# Patient Record
Sex: MALE | Race: WHITE | NOT HISPANIC OR LATINO | Employment: OTHER | URBAN - NONMETROPOLITAN AREA
[De-identification: names, ages, dates, MRNs, and addresses within clinical notes are randomized per-mention and may not be internally consistent; named-entity substitution may affect disease eponyms.]

---

## 2017-07-25 ENCOUNTER — HOSPITAL ENCOUNTER (INPATIENT)
Facility: HOSPITAL | Age: 82
LOS: 1 days | Discharge: HOME | End: 2017-07-27
Attending: FAMILY MEDICINE | Admitting: INTERNAL MEDICINE

## 2017-07-25 DIAGNOSIS — R07.89 ATYPICAL CHEST PAIN: ICD-10-CM

## 2017-07-25 DIAGNOSIS — R00.1 BRADYCARDIA: Primary | ICD-10-CM

## 2017-07-25 LAB
HOLD SPECIMEN: NORMAL
HOLD SPECIMEN: NORMAL
TROPONIN I SERPL-MCNC: 0.01 NG/ML (ref 0–0.03)
WHOLE BLOOD HOLD SPECIMEN: NORMAL
WHOLE BLOOD HOLD SPECIMEN: NORMAL

## 2017-07-25 PROCEDURE — 93005 ELECTROCARDIOGRAM TRACING: CPT

## 2017-07-25 PROCEDURE — 94799 UNLISTED PULMONARY SVC/PX: CPT

## 2017-07-25 PROCEDURE — 93010 ELECTROCARDIOGRAM REPORT: CPT | Performed by: INTERNAL MEDICINE

## 2017-07-25 PROCEDURE — 84484 ASSAY OF TROPONIN QUANT: CPT | Performed by: FAMILY MEDICINE

## 2017-07-25 PROCEDURE — G0378 HOSPITAL OBSERVATION PER HR: HCPCS

## 2017-07-25 PROCEDURE — 94760 N-INVAS EAR/PLS OXIMETRY 1: CPT

## 2017-07-25 PROCEDURE — 99285 EMERGENCY DEPT VISIT HI MDM: CPT

## 2017-07-25 RX ORDER — ALBUTEROL SULFATE 90 UG/1
2 AEROSOL, METERED RESPIRATORY (INHALATION) EVERY 4 HOURS PRN
COMMUNITY

## 2017-07-25 RX ORDER — SIMVASTATIN 80 MG
80 TABLET ORAL NIGHTLY
COMMUNITY

## 2017-07-25 RX ORDER — ASPIRIN 81 MG/1
81 TABLET, CHEWABLE ORAL DAILY
COMMUNITY

## 2017-07-25 RX ORDER — CHLORAL HYDRATE 500 MG
CAPSULE ORAL
COMMUNITY

## 2017-07-26 ENCOUNTER — APPOINTMENT (OUTPATIENT)
Dept: GENERAL RADIOLOGY | Facility: HOSPITAL | Age: 82
End: 2017-07-26

## 2017-07-26 LAB
ALBUMIN SERPL-MCNC: 4.1 G/DL (ref 3.5–5)
ALBUMIN/GLOB SERPL: 1.5 G/DL (ref 1.1–2.5)
ALP SERPL-CCNC: 78 U/L (ref 24–120)
ALT SERPL W P-5'-P-CCNC: 51 U/L (ref 0–54)
ANION GAP SERPL CALCULATED.3IONS-SCNC: 9 MMOL/L (ref 4–13)
AST SERPL-CCNC: 40 U/L (ref 7–45)
BASOPHILS # BLD AUTO: 0.04 10*3/MM3 (ref 0–0.2)
BASOPHILS NFR BLD AUTO: 0.4 % (ref 0–2)
BILIRUB SERPL-MCNC: 0.9 MG/DL (ref 0.1–1)
BILIRUB UR QL STRIP: NEGATIVE
BUN BLD-MCNC: 11 MG/DL (ref 5–21)
BUN/CREAT SERPL: 13.8 (ref 7–25)
CALCIUM SPEC-SCNC: 9.3 MG/DL (ref 8.4–10.4)
CHLORIDE SERPL-SCNC: 108 MMOL/L (ref 98–110)
CLARITY UR: CLEAR
CO2 SERPL-SCNC: 27 MMOL/L (ref 24–31)
COLOR UR: YELLOW
CREAT BLD-MCNC: 0.8 MG/DL (ref 0.5–1.4)
DEPRECATED RDW RBC AUTO: 46.3 FL (ref 40–54)
EOSINOPHIL # BLD AUTO: 0.18 10*3/MM3 (ref 0–0.7)
EOSINOPHIL NFR BLD AUTO: 1.8 % (ref 0–4)
ERYTHROCYTE [DISTWIDTH] IN BLOOD BY AUTOMATED COUNT: 14.1 % (ref 12–15)
GFR SERPL CREATININE-BSD FRML MDRD: 93 ML/MIN/1.73
GLOBULIN UR ELPH-MCNC: 2.8 GM/DL
GLUCOSE BLD-MCNC: 98 MG/DL (ref 70–100)
GLUCOSE UR STRIP-MCNC: NEGATIVE MG/DL
HCT VFR BLD AUTO: 45.2 % (ref 40–52)
HGB BLD-MCNC: 15.3 G/DL (ref 14–18)
HGB UR QL STRIP.AUTO: NEGATIVE
IMM GRANULOCYTES # BLD: 0.03 10*3/MM3 (ref 0–0.03)
IMM GRANULOCYTES NFR BLD: 0.3 % (ref 0–5)
INR PPP: 0.92 (ref 0.91–1.09)
KETONES UR QL STRIP: NEGATIVE
LEUKOCYTE ESTERASE UR QL STRIP.AUTO: NEGATIVE
LYMPHOCYTES # BLD AUTO: 1.23 10*3/MM3 (ref 0.72–4.86)
LYMPHOCYTES NFR BLD AUTO: 12.3 % (ref 15–45)
MCH RBC QN AUTO: 30.7 PG (ref 28–32)
MCHC RBC AUTO-ENTMCNC: 33.8 G/DL (ref 33–36)
MCV RBC AUTO: 90.6 FL (ref 82–95)
MONOCYTES # BLD AUTO: 0.68 10*3/MM3 (ref 0.19–1.3)
MONOCYTES NFR BLD AUTO: 6.8 % (ref 4–12)
NEUTROPHILS # BLD AUTO: 7.88 10*3/MM3 (ref 1.87–8.4)
NEUTROPHILS NFR BLD AUTO: 78.4 % (ref 39–78)
NITRITE UR QL STRIP: NEGATIVE
PH UR STRIP.AUTO: 6 [PH] (ref 5–8)
PLATELET # BLD AUTO: 184 10*3/MM3 (ref 130–400)
PMV BLD AUTO: 12.8 FL (ref 6–12)
POTASSIUM BLD-SCNC: 4.7 MMOL/L (ref 3.5–5.3)
PROT SERPL-MCNC: 6.9 G/DL (ref 6.3–8.7)
PROT UR QL STRIP: NEGATIVE
PROTHROMBIN TIME: 12.6 SECONDS (ref 11.9–14.6)
RBC # BLD AUTO: 4.99 10*6/MM3 (ref 4.8–5.9)
SODIUM BLD-SCNC: 144 MMOL/L (ref 135–145)
SP GR UR STRIP: 1.02 (ref 1–1.03)
TSH SERPL DL<=0.05 MIU/L-ACNC: 1.87 MIU/ML (ref 0.47–4.68)
UROBILINOGEN UR QL STRIP: NORMAL
WBC NRBC COR # BLD: 10.04 10*3/MM3 (ref 4.8–10.8)

## 2017-07-26 PROCEDURE — 71010 HC CHEST PA OR AP: CPT

## 2017-07-26 PROCEDURE — 93010 ELECTROCARDIOGRAM REPORT: CPT | Performed by: INTERNAL MEDICINE

## 2017-07-26 PROCEDURE — 84443 ASSAY THYROID STIM HORMONE: CPT | Performed by: NURSE PRACTITIONER

## 2017-07-26 PROCEDURE — 25010000002 FENTANYL CITRATE (PF) 100 MCG/2ML SOLUTION: Performed by: INTERNAL MEDICINE

## 2017-07-26 PROCEDURE — C1898 LEAD, PMKR, OTHER THAN TRANS: HCPCS | Performed by: INTERNAL MEDICINE

## 2017-07-26 PROCEDURE — C1785 PMKR, DUAL, RATE-RESP: HCPCS | Performed by: INTERNAL MEDICINE

## 2017-07-26 PROCEDURE — 99222 1ST HOSP IP/OBS MODERATE 55: CPT | Performed by: INTERNAL MEDICINE

## 2017-07-26 PROCEDURE — 93005 ELECTROCARDIOGRAM TRACING: CPT | Performed by: INTERNAL MEDICINE

## 2017-07-26 PROCEDURE — C1892 INTRO/SHEATH,FIXED,PEEL-AWAY: HCPCS | Performed by: INTERNAL MEDICINE

## 2017-07-26 PROCEDURE — 02HK3JZ INSERTION OF PACEMAKER LEAD INTO RIGHT VENTRICLE, PERCUTANEOUS APPROACH: ICD-10-PCS | Performed by: INTERNAL MEDICINE

## 2017-07-26 PROCEDURE — 02H63JZ INSERTION OF PACEMAKER LEAD INTO RIGHT ATRIUM, PERCUTANEOUS APPROACH: ICD-10-PCS | Performed by: INTERNAL MEDICINE

## 2017-07-26 PROCEDURE — 0JH606Z INSERTION OF PACEMAKER, DUAL CHAMBER INTO CHEST SUBCUTANEOUS TISSUE AND FASCIA, OPEN APPROACH: ICD-10-PCS | Performed by: INTERNAL MEDICINE

## 2017-07-26 PROCEDURE — 80053 COMPREHEN METABOLIC PANEL: CPT | Performed by: NURSE PRACTITIONER

## 2017-07-26 PROCEDURE — 33208 INSRT HEART PM ATRIAL & VENT: CPT | Performed by: INTERNAL MEDICINE

## 2017-07-26 PROCEDURE — 81003 URINALYSIS AUTO W/O SCOPE: CPT | Performed by: NURSE PRACTITIONER

## 2017-07-26 PROCEDURE — 85610 PROTHROMBIN TIME: CPT | Performed by: NURSE PRACTITIONER

## 2017-07-26 PROCEDURE — 99152 MOD SED SAME PHYS/QHP 5/>YRS: CPT | Performed by: INTERNAL MEDICINE

## 2017-07-26 PROCEDURE — 25010000002 MIDAZOLAM PER 1 MG: Performed by: INTERNAL MEDICINE

## 2017-07-26 PROCEDURE — 85025 COMPLETE CBC W/AUTO DIFF WBC: CPT | Performed by: NURSE PRACTITIONER

## 2017-07-26 DEVICE — PACEMAKER
Type: IMPLANTABLE DEVICE | Status: FUNCTIONAL
Brand: ESSENTIO™ MRI EL DR

## 2017-07-26 DEVICE — IMPLANTABLE DEVICE: Type: IMPLANTABLE DEVICE | Status: FUNCTIONAL

## 2017-07-26 RX ORDER — HYDROCODONE BITARTRATE AND ACETAMINOPHEN 5; 325 MG/1; MG/1
1 TABLET ORAL EVERY 4 HOURS PRN
Status: DISCONTINUED | OUTPATIENT
Start: 2017-07-26 | End: 2017-07-27 | Stop reason: HOSPADM

## 2017-07-26 RX ORDER — DIPHENHYDRAMINE HCL 25 MG
25 CAPSULE ORAL NIGHTLY PRN
Status: DISCONTINUED | OUTPATIENT
Start: 2017-07-26 | End: 2017-07-27 | Stop reason: HOSPADM

## 2017-07-26 RX ORDER — HYDROCODONE BITARTRATE AND ACETAMINOPHEN 7.5; 325 MG/1; MG/1
2 TABLET ORAL EVERY 4 HOURS PRN
Status: DISCONTINUED | OUTPATIENT
Start: 2017-07-26 | End: 2017-07-27 | Stop reason: HOSPADM

## 2017-07-26 RX ORDER — ACETAMINOPHEN 325 MG/1
650 TABLET ORAL EVERY 4 HOURS PRN
Status: DISCONTINUED | OUTPATIENT
Start: 2017-07-26 | End: 2017-07-27 | Stop reason: HOSPADM

## 2017-07-26 RX ORDER — AMLODIPINE BESYLATE 5 MG/1
5 TABLET ORAL
Status: DISCONTINUED | OUTPATIENT
Start: 2017-07-26 | End: 2017-07-27 | Stop reason: HOSPADM

## 2017-07-26 RX ORDER — MIDAZOLAM HYDROCHLORIDE 1 MG/ML
INJECTION INTRAMUSCULAR; INTRAVENOUS AS NEEDED
Status: DISCONTINUED | OUTPATIENT
Start: 2017-07-26 | End: 2017-07-26 | Stop reason: HOSPADM

## 2017-07-26 RX ORDER — FAMOTIDINE 20 MG/1
20 TABLET, FILM COATED ORAL 2 TIMES DAILY
Status: DISCONTINUED | OUTPATIENT
Start: 2017-07-26 | End: 2017-07-27 | Stop reason: HOSPADM

## 2017-07-26 RX ORDER — ALBUTEROL SULFATE 2.5 MG/3ML
2.5 SOLUTION RESPIRATORY (INHALATION) EVERY 6 HOURS PRN
Status: DISCONTINUED | OUTPATIENT
Start: 2017-07-26 | End: 2017-07-27 | Stop reason: HOSPADM

## 2017-07-26 RX ORDER — ATORVASTATIN CALCIUM 40 MG/1
40 TABLET, FILM COATED ORAL NIGHTLY
Status: DISCONTINUED | OUTPATIENT
Start: 2017-07-26 | End: 2017-07-27 | Stop reason: HOSPADM

## 2017-07-26 RX ORDER — FENTANYL CITRATE 50 UG/ML
INJECTION, SOLUTION INTRAMUSCULAR; INTRAVENOUS AS NEEDED
Status: DISCONTINUED | OUTPATIENT
Start: 2017-07-26 | End: 2017-07-26 | Stop reason: HOSPADM

## 2017-07-26 RX ORDER — DIPHENHYDRAMINE HYDROCHLORIDE 50 MG/ML
25 INJECTION INTRAMUSCULAR; INTRAVENOUS NIGHTLY PRN
Status: DISCONTINUED | OUTPATIENT
Start: 2017-07-26 | End: 2017-07-27 | Stop reason: HOSPADM

## 2017-07-26 RX ORDER — NALOXONE HCL 0.4 MG/ML
0.4 VIAL (ML) INJECTION
Status: DISCONTINUED | OUTPATIENT
Start: 2017-07-26 | End: 2017-07-27 | Stop reason: HOSPADM

## 2017-07-26 RX ORDER — LIDOCAINE HYDROCHLORIDE 20 MG/ML
INJECTION, SOLUTION INFILTRATION; PERINEURAL AS NEEDED
Status: DISCONTINUED | OUTPATIENT
Start: 2017-07-26 | End: 2017-07-26 | Stop reason: HOSPADM

## 2017-07-26 RX ORDER — SODIUM CHLORIDE 0.9 % (FLUSH) 0.9 %
1-10 SYRINGE (ML) INJECTION AS NEEDED
Status: DISCONTINUED | OUTPATIENT
Start: 2017-07-26 | End: 2017-07-27 | Stop reason: HOSPADM

## 2017-07-26 RX ORDER — SODIUM CHLORIDE 0.9 % (FLUSH) 0.9 %
1-10 SYRINGE (ML) INJECTION AS NEEDED
Status: DISCONTINUED | OUTPATIENT
Start: 2017-07-26 | End: 2017-07-26

## 2017-07-26 RX ORDER — ONDANSETRON 2 MG/ML
4 INJECTION INTRAMUSCULAR; INTRAVENOUS EVERY 6 HOURS PRN
Status: DISCONTINUED | OUTPATIENT
Start: 2017-07-26 | End: 2017-07-27 | Stop reason: HOSPADM

## 2017-07-26 RX ORDER — ASPIRIN 81 MG/1
81 TABLET, CHEWABLE ORAL DAILY
Status: DISCONTINUED | OUTPATIENT
Start: 2017-07-26 | End: 2017-07-27 | Stop reason: HOSPADM

## 2017-07-26 RX ADMIN — AMLODIPINE BESYLATE 5 MG: 5 TABLET ORAL at 18:28

## 2017-07-26 RX ADMIN — FAMOTIDINE 20 MG: 20 TABLET, FILM COATED ORAL at 18:28

## 2017-07-26 RX ADMIN — Medication 1 APPLICATION: at 18:28

## 2017-07-26 RX ADMIN — ACETAMINOPHEN 650 MG: 325 TABLET, FILM COATED ORAL at 20:14

## 2017-07-26 RX ADMIN — DESMOPRESSIN ACETATE 40 MG: 0.2 TABLET ORAL at 20:06

## 2017-07-26 RX ADMIN — ASPIRIN 81 MG 81 MG: 81 TABLET ORAL at 18:28

## 2017-07-27 VITALS
WEIGHT: 165.63 LBS | OXYGEN SATURATION: 94 % | TEMPERATURE: 98.1 F | BODY MASS INDEX: 23.71 KG/M2 | SYSTOLIC BLOOD PRESSURE: 93 MMHG | HEIGHT: 70 IN | RESPIRATION RATE: 18 BRPM | HEART RATE: 60 BPM | DIASTOLIC BLOOD PRESSURE: 48 MMHG

## 2017-07-27 PROBLEM — Z95.0 S/P CARDIAC PACEMAKER PROCEDURE: Status: ACTIVE | Noted: 2017-07-27

## 2017-07-27 PROBLEM — I10 ESSENTIAL HYPERTENSION: Status: ACTIVE | Noted: 2017-07-27

## 2017-07-27 PROBLEM — I25.10 CORONARY ARTERY DISEASE INVOLVING NATIVE CORONARY ARTERY OF NATIVE HEART WITHOUT ANGINA PECTORIS: Status: ACTIVE | Noted: 2017-07-27

## 2017-07-27 PROBLEM — I44.1 MOBITZ TYPE 2 SECOND DEGREE HEART BLOCK: Status: ACTIVE | Noted: 2017-07-27

## 2017-07-27 PROBLEM — E78.2 MIXED HYPERLIPIDEMIA: Status: ACTIVE | Noted: 2017-07-27

## 2017-07-27 RX ADMIN — AMLODIPINE BESYLATE 5 MG: 5 TABLET ORAL at 08:14

## 2017-07-27 RX ADMIN — ASPIRIN 81 MG 81 MG: 81 TABLET ORAL at 08:14

## 2017-07-27 RX ADMIN — FAMOTIDINE 20 MG: 20 TABLET, FILM COATED ORAL at 08:14

## 2017-07-27 RX ADMIN — ACETAMINOPHEN 650 MG: 325 TABLET, FILM COATED ORAL at 08:14

## 2017-07-27 RX ADMIN — Medication 1 APPLICATION: at 08:14

## 2017-08-01 ENCOUNTER — APPOINTMENT (OUTPATIENT)
Dept: GENERAL RADIOLOGY | Facility: HOSPITAL | Age: 82
End: 2017-08-01

## 2017-08-01 ENCOUNTER — HOSPITAL ENCOUNTER (EMERGENCY)
Facility: HOSPITAL | Age: 82
Discharge: HOME | End: 2017-08-02
Attending: EMERGENCY MEDICINE | Admitting: EMERGENCY MEDICINE

## 2017-08-01 DIAGNOSIS — R11.0 NAUSEA: Primary | ICD-10-CM

## 2017-08-01 LAB
ALBUMIN SERPL-MCNC: 4.4 G/DL (ref 3.5–5)
ALBUMIN/GLOB SERPL: 1.5 G/DL (ref 1.1–2.5)
ALP SERPL-CCNC: 85 U/L (ref 24–120)
ALT SERPL W P-5'-P-CCNC: 43 U/L (ref 0–54)
AMYLASE SERPL-CCNC: 62 U/L (ref 30–110)
ANION GAP SERPL CALCULATED.3IONS-SCNC: 11 MMOL/L (ref 4–13)
AST SERPL-CCNC: 34 U/L (ref 7–45)
BASOPHILS # BLD AUTO: 0.05 10*3/MM3 (ref 0–0.2)
BASOPHILS NFR BLD AUTO: 0.5 % (ref 0–2)
BILIRUB SERPL-MCNC: 0.5 MG/DL (ref 0.1–1)
BILIRUB UR QL STRIP: NEGATIVE
BUN BLD-MCNC: 10 MG/DL (ref 5–21)
BUN/CREAT SERPL: 12.5 (ref 7–25)
CALCIUM SPEC-SCNC: 9.4 MG/DL (ref 8.4–10.4)
CHLORIDE SERPL-SCNC: 105 MMOL/L (ref 98–110)
CLARITY UR: CLEAR
CO2 SERPL-SCNC: 24 MMOL/L (ref 24–31)
COLOR UR: YELLOW
CREAT BLD-MCNC: 0.8 MG/DL (ref 0.5–1.4)
DEPRECATED RDW RBC AUTO: 46.1 FL (ref 40–54)
EOSINOPHIL # BLD AUTO: 0.23 10*3/MM3 (ref 0–0.7)
EOSINOPHIL NFR BLD AUTO: 2.5 % (ref 0–4)
ERYTHROCYTE [DISTWIDTH] IN BLOOD BY AUTOMATED COUNT: 13.9 % (ref 12–15)
GFR SERPL CREATININE-BSD FRML MDRD: 93 ML/MIN/1.73
GLOBULIN UR ELPH-MCNC: 3 GM/DL
GLUCOSE BLD-MCNC: 97 MG/DL (ref 70–100)
GLUCOSE BLDC GLUCOMTR-MCNC: 132 MG/DL (ref 70–130)
GLUCOSE UR STRIP-MCNC: NEGATIVE MG/DL
HCT VFR BLD AUTO: 45.3 % (ref 40–52)
HGB BLD-MCNC: 15.4 G/DL (ref 14–18)
HGB UR QL STRIP.AUTO: NEGATIVE
HOLD SPECIMEN: NORMAL
HOLD SPECIMEN: NORMAL
IMM GRANULOCYTES # BLD: 0.01 10*3/MM3 (ref 0–0.03)
IMM GRANULOCYTES NFR BLD: 0.1 % (ref 0–5)
INR PPP: 0.9 (ref 0.91–1.09)
KETONES UR QL STRIP: NEGATIVE
LEUKOCYTE ESTERASE UR QL STRIP.AUTO: NEGATIVE
LIPASE SERPL-CCNC: 70 U/L (ref 23–203)
LYMPHOCYTES # BLD AUTO: 1.72 10*3/MM3 (ref 0.72–4.86)
LYMPHOCYTES NFR BLD AUTO: 18.6 % (ref 15–45)
MCH RBC QN AUTO: 30.8 PG (ref 28–32)
MCHC RBC AUTO-ENTMCNC: 34 G/DL (ref 33–36)
MCV RBC AUTO: 90.6 FL (ref 82–95)
MONOCYTES # BLD AUTO: 0.93 10*3/MM3 (ref 0.19–1.3)
MONOCYTES NFR BLD AUTO: 10.1 % (ref 4–12)
MYOGLOBIN SERPL-MCNC: 72.5 NG/ML (ref 0–110)
NEUTROPHILS # BLD AUTO: 6.31 10*3/MM3 (ref 1.87–8.4)
NEUTROPHILS NFR BLD AUTO: 68.2 % (ref 39–78)
NITRITE UR QL STRIP: NEGATIVE
PH UR STRIP.AUTO: 6 [PH] (ref 5–8)
PLATELET # BLD AUTO: 163 10*3/MM3 (ref 130–400)
PMV BLD AUTO: 13.4 FL (ref 6–12)
POTASSIUM BLD-SCNC: 3.6 MMOL/L (ref 3.5–5.3)
PROT SERPL-MCNC: 7.4 G/DL (ref 6.3–8.7)
PROT UR QL STRIP: NEGATIVE
PROTHROMBIN TIME: 12.4 SECONDS (ref 11.9–14.6)
RBC # BLD AUTO: 5 10*6/MM3 (ref 4.8–5.9)
SODIUM BLD-SCNC: 140 MMOL/L (ref 135–145)
SP GR UR STRIP: 1.01 (ref 1–1.03)
TROPONIN I SERPL-MCNC: <0.012 NG/ML (ref 0–0.03)
TROPONIN I SERPL-MCNC: <0.012 NG/ML (ref 0–0.03)
UROBILINOGEN UR QL STRIP: NORMAL
WBC NRBC COR # BLD: 9.25 10*3/MM3 (ref 4.8–10.8)
WHOLE BLOOD HOLD SPECIMEN: NORMAL
WHOLE BLOOD HOLD SPECIMEN: NORMAL

## 2017-08-01 PROCEDURE — 82150 ASSAY OF AMYLASE: CPT | Performed by: EMERGENCY MEDICINE

## 2017-08-01 PROCEDURE — 93005 ELECTROCARDIOGRAM TRACING: CPT | Performed by: EMERGENCY MEDICINE

## 2017-08-01 PROCEDURE — 85610 PROTHROMBIN TIME: CPT | Performed by: EMERGENCY MEDICINE

## 2017-08-01 PROCEDURE — 83690 ASSAY OF LIPASE: CPT | Performed by: EMERGENCY MEDICINE

## 2017-08-01 PROCEDURE — 93005 ELECTROCARDIOGRAM TRACING: CPT

## 2017-08-01 PROCEDURE — 80053 COMPREHEN METABOLIC PANEL: CPT | Performed by: EMERGENCY MEDICINE

## 2017-08-01 PROCEDURE — 96374 THER/PROPH/DIAG INJ IV PUSH: CPT

## 2017-08-01 PROCEDURE — 82962 GLUCOSE BLOOD TEST: CPT

## 2017-08-01 PROCEDURE — 99284 EMERGENCY DEPT VISIT MOD MDM: CPT

## 2017-08-01 PROCEDURE — 81003 URINALYSIS AUTO W/O SCOPE: CPT | Performed by: EMERGENCY MEDICINE

## 2017-08-01 PROCEDURE — 83874 ASSAY OF MYOGLOBIN: CPT | Performed by: EMERGENCY MEDICINE

## 2017-08-01 PROCEDURE — 93010 ELECTROCARDIOGRAM REPORT: CPT | Performed by: INTERNAL MEDICINE

## 2017-08-01 PROCEDURE — 71010 HC CHEST PA OR AP: CPT

## 2017-08-01 PROCEDURE — 96361 HYDRATE IV INFUSION ADD-ON: CPT

## 2017-08-01 PROCEDURE — 84484 ASSAY OF TROPONIN QUANT: CPT | Performed by: EMERGENCY MEDICINE

## 2017-08-01 PROCEDURE — 25010000002 ONDANSETRON PER 1 MG: Performed by: EMERGENCY MEDICINE

## 2017-08-01 PROCEDURE — 85025 COMPLETE CBC W/AUTO DIFF WBC: CPT | Performed by: EMERGENCY MEDICINE

## 2017-08-01 RX ORDER — ONDANSETRON 2 MG/ML
4 INJECTION INTRAMUSCULAR; INTRAVENOUS ONCE
Status: COMPLETED | OUTPATIENT
Start: 2017-08-01 | End: 2017-08-01

## 2017-08-01 RX ADMIN — ONDANSETRON 4 MG: 2 INJECTION INTRAMUSCULAR; INTRAVENOUS at 21:52

## 2017-08-01 RX ADMIN — SODIUM CHLORIDE 500 ML: 9 INJECTION, SOLUTION INTRAVENOUS at 21:54

## 2017-08-02 VITALS
RESPIRATION RATE: 16 BRPM | WEIGHT: 173 LBS | HEIGHT: 71 IN | SYSTOLIC BLOOD PRESSURE: 124 MMHG | HEART RATE: 63 BPM | OXYGEN SATURATION: 93 % | TEMPERATURE: 98.3 F | BODY MASS INDEX: 24.22 KG/M2 | DIASTOLIC BLOOD PRESSURE: 60 MMHG

## 2017-08-02 NOTE — ED PROVIDER NOTES
Subjective   Patient is a 81 y.o. male presenting with dizziness.   Dizziness   Quality:  Lightheadedness  Severity:  Moderate  Onset quality:  Unable to specify  Timing:  Intermittent  Progression:  Resolved  Chronicity:  New  Context: not when bending over, not with bowel movement, not with ear pain, not with eye movement, not with head movement, not with inactivity, not with loss of consciousness, not with medication, not with physical activity, not when standing up and not when urinating    Relieved by:  Nothing  Worsened by:  Nothing  Ineffective treatments:  None tried  Associated symptoms: no blood in stool, no chest pain, no diarrhea, no headaches, no hearing loss, no nausea, no palpitations, no shortness of breath, no syncope, no tinnitus, no vision changes, no vomiting and no weakness    Risk factors: no anemia, no hx of vertigo and no new medications        Review of Systems   Constitutional: Negative.    HENT: Negative.  Negative for hearing loss and tinnitus.    Respiratory: Negative for shortness of breath.    Cardiovascular: Negative for chest pain, palpitations and syncope.   Gastrointestinal: Negative.  Negative for abdominal distention, abdominal pain, blood in stool, diarrhea, nausea and vomiting.   Endocrine: Negative.    Genitourinary: Negative.    Musculoskeletal: Negative.  Negative for back pain and neck pain.   Skin: Negative for color change and pallor.   Neurological: Positive for dizziness. Negative for syncope, weakness, light-headedness, numbness and headaches.   Hematological: Negative.  Does not bruise/bleed easily.   All other systems reviewed and are negative.      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   •  SOB (shortness of breath)      MAY BE AN ANIGNAL EQUIVALENT       No Known Allergies    Past Surgical History:   Procedure Laterality Date   • CARDIAC ELECTROPHYSIOLOGY PROCEDURE N/A 7/26/2017    Procedure: Pacemaker SC new;  Surgeon: Andrew Germain MD;  Location:  [facility]ATH [facility];  Service:    • CHOLECYSTECTOMY     • CORONARY ARTERY BYPASS GRAFT     • HEMORRHOIDECTOMY     • HIP SURGERY     • JOINT REPLACEMENT         Family History   Problem Relation Age of Onset   • Coronary artery disease Mother        Social History     Social History   • Marital status:      Spouse name: N/A   • Number of children: N/A   • Years of education: N/A     Social History Main Topics   • Smoking status: Never Smoker   • Smokeless tobacco: None   • Alcohol use No   • Drug use: No   • Sexual activity: Not Asked     Other Topics Concern   • None     Social History Narrative           Objective   Physical Exam   Constitutional: He is oriented to person, place, and time. He appears well-developed.  Non-toxic appearance.   HENT:   Head: Normocephalic and atraumatic.   Mouth/Throat: Uvula is midline and mucous membranes are normal.   Eyes: Conjunctivae and lids are normal. Pupils are equal, round, and reactive to light. Lids are everted and swept, no foreign bodies found.   Neck: Trachea normal, normal range of motion, full passive range of motion without pain and phonation normal. Neck supple. Normal carotid pulses and no JVD present. Carotid bruit is not present. No rigidity. No tracheal deviation present.   Cardiovascular: Normal rate, regular rhythm, normal heart sounds, intact distal pulses and normal pulses.  PMI is not displaced.    Pulmonary/Chest: Effort normal and breath sounds normal. No accessory muscle usage or stridor. No apnea and no tachypnea. He has no decreased breath sounds. He has no wheezes. He has no rhonchi. He has no rales.   Abdominal: Soft. Normal appearance, normal aorta and bowel  sounds are normal. There is no hepatosplenomegaly. There is no tenderness.   Musculoskeletal: Normal range of motion.   Lower extremity exam bilaterally is unremarkable.  There is no right or left calf tenderness .  There is no palpable venous cord.  No obvious difference in the size of the legs.  No pitting edema.  The dorsalis pedis and posterior tibial femoral and popliteal pulses are palpable and +2 bilaterally.  Homans sign is negative       Vascular Status -  His exam exhibits right foot vasculature normal. His exam exhibits no right foot edema. His exam exhibits left foot vasculature normal. His exam exhibits no left foot edema.  Neurological: He is alert and oriented to person, place, and time. He has normal strength and normal reflexes. He displays normal reflexes. No cranial nerve deficit or sensory deficit. Gait normal. GCS eye subscore is 4. GCS verbal subscore is 5. GCS motor subscore is 6.   Skin: Skin is warm, dry and intact. No cyanosis. No pallor. Nails show no clubbing.   Psychiatric: He has a normal mood and affect. His speech is normal and behavior is normal.   Nursing note and vitals reviewed.      Procedures         ED Course  ED Course   Comment By Time   Nsr with pac Andrea Whalen MD 08/01 9889   HIGH-GRADE AV BLOCK WITH HIGH-GRADE SYMPTOMATIC BRADYCARDIA WITHOUT REVERSIBLE ETIOLOGY Andrea Whalen MD 08/01 5221   7/25/17 pace maker Andrea Whalen MD 08/01 2337   Patient came in with the episode of dizziness and nausea no chest pain no shortness of breath workup is negative pacemaker is functioning well I have discussed this case with Dr. Germain the patient will be discharge home Andrea Whalen MD 08/02 0010   The patient's symptoms are now better.  Patient is not having pain.  No chest pain, difficulty breathing, nausea, vomiting or palpitations.  No anxiety or dizziness.  Vital signs have been reviewed and appear to be correct.  Physical exam findings are improved.  Alert.  Oriented X3 .  No  acute distress.  Breath sounds normal.  No respiratory distress, decreased breath sounds or wheezes.  Normal heart rate and rhythm.  Heart sounds normal.  .  Abdomen soft and nontender.  No abdominal tenderness or guarding or rebound tenderness.  Skin warm and dry.  No cyanosis or diaphoresis.  Oriented X 3.  Not anxious.  No alteration in mental status or weakness. Andrea Whalen MD 08/02 0010                  [facility]    Final diagnoses:   Nausea            Andrea Whalen MD  08/02/17 0011

## 2017-08-04 NOTE — ED NOTES
ED Call Back Questions    1. How are you doing since leaving the Emergency Department?    Feeling good  2. Do you have any questions about your discharge instructions? No     3. Have you filled your new prescriptions yet? No   a. Do you have any questions about those medications? No     4. Were you able to make a follow-up appointment with the physician? Yes     5. Do you have a primary care physician? No   a. If No, would you like for me to set you up with one? No   i. If Yes, “I will have our ED  give you a call right back at this number to work with you on the best time for an appointment.”    6. We are always looking to get better at what we do. Do you have any suggestions for what we can do to be even better? No   a. Thank you for sharing your concerns. I apologize. I will follow up with our manager and patient . Would you like someone to call you back? No     7. Is there anything else I can do for you? No   Visit was very good     Daryn Velez  08/04/17 3024

## 2017-09-12 ENCOUNTER — CLINICAL SUPPORT (OUTPATIENT)
Dept: CARDIOLOGY | Facility: CLINIC | Age: 82
End: 2017-09-12

## 2017-09-12 DIAGNOSIS — I44.1 MOBITZ TYPE 2 SECOND DEGREE HEART BLOCK: Primary | ICD-10-CM

## 2017-09-12 PROCEDURE — 93283 PRGRMG EVAL IMPLANTABLE DFB: CPT | Performed by: INTERNAL MEDICINE

## 2017-09-12 NOTE — PROGRESS NOTES
Dual Chamber Pacemaker Evaluation Report  In Office     September 12, 2017    Primary Cardiologist: Jessa  : Guidant Model: Essentio  Implant date: July 26,2017    Reason for evaluation: new implant follow up  Indication for pacemaker: Mobitz Type 2 Second Degree Heart Block.    Measurements  Atrial sensing - P wave: 8.3 mV  Atrial threshold: 1.1V@ 0.4ms  Atrial lead impedance: 725 ohms  Ventricular sensing - R wave: 19.8 mV  Ventricular threshold: 0.6 V @ 0.4 ms  Ventricular lead impedance:   830 ohms     Diagnostic Data  Atrial paced: 25 %  Ventricular paced: 100 %  Other: Several Episodes recorded as PMT- appears to be sinus tachy at Max tracking rate.   No retrograde conduction  Reproduced upon testing. Incision WNL  Battery status: satisfactory   Est 14.5 Years       Final Parameters  Mode:  DDDR  Lower rate: 60 bpm   Upper rate: 120 bpm  AV Delay: paced- 120-180 ms  Vpqznf-076-147 ms  Atrial - Amplitude: 2.2 V   Pulse width: 0.4 ms   Sensitivity: 0.25 mV     Ventricular - Amplitude: 1.1 V  Pulse width: 0.4 ms  Sensitivity: 0.6 mV    Changes made: Atrial output to 2.2V   Conclusions: normal pacemaker function    Follow up: 6 Months.    Interrogation done by company device rep.

## 2018-01-30 ENCOUNTER — CLINICAL SUPPORT NO REQUIREMENTS (OUTPATIENT)
Dept: CARDIOLOGY | Facility: CLINIC | Age: 83
End: 2018-01-30

## 2018-01-30 DIAGNOSIS — Z95.0 PACEMAKER: Primary | ICD-10-CM

## 2018-01-30 DIAGNOSIS — I44.1 SECOND DEGREE HEART BLOCK: ICD-10-CM

## 2018-01-30 PROCEDURE — 93280 PM DEVICE PROGR EVAL DUAL: CPT | Performed by: INTERNAL MEDICINE

## 2018-01-30 NOTE — PROGRESS NOTES
Dual Chamber Pacemaker Evaluation Report  IN OFFICE    January 30, 2018    Primary Cardiologist: Jessa  : Guidant Model: Essentio  Implant date: 07/26/2017    Reason for evaluation: routine  Indication for pacemaker:  Mobitz Type 2 Second Degree Heart Block    Measurements  Atrial sensing - P wave: 6.7 mV  Atrial threshold: 1.2V@ 0.4ms  Atrial lead impedance: 691 ohms  Ventricular sensing - R wave: 12.9 mV  Ventricular threshold: 0.7 V @ 0.4 ms  Ventricular lead impedance:   758 ohms     Diagnostic Data  Atrial paced: 22 %  Ventricular paced: 100 %  Other: Several episodes of PMT noted on report; appear to be sinus tach at max tracking rate.  No retrograde conduction noted on testing.  Two episodes of AF noted, longest in duration approx. 4 seconds.  Battery status: satisfactory   Est 14 years      Final Parameters  Mode:  DDDR  Lower rate: 60 bpm   Upper rate: 120 bpm  AV Delay: paced- 120-180 ms  Indxnz-553-187 ms  Atrial - Amplitude: 2.4 V   Pulse width: 0.4 ms   Sensitivity: 0.25 mV     Ventricular - Amplitude: 1.2 V  Pulse width: 0.4 ms  Sensitivity: 0.6 mV    Changes made: MV turned off; Accelerometer turned on; Normal chelsea a-amplitude increased to 2.4V  Conclusions: normal pacemaker function and stable pacing and sensing thresholds    Follow up: 6 months via [employer]     Interrogation performed by Elisabeth Rose RN ([employer] Rep)

## 2018-03-18 ENCOUNTER — HOSPITAL ENCOUNTER (EMERGENCY)
Facility: HOSPITAL | Age: 83
Discharge: HOME | End: 2018-03-18
Attending: EMERGENCY MEDICINE | Admitting: EMERGENCY MEDICINE

## 2018-03-18 VITALS
SYSTOLIC BLOOD PRESSURE: 139 MMHG | DIASTOLIC BLOOD PRESSURE: 77 MMHG | RESPIRATION RATE: 16 BRPM | OXYGEN SATURATION: 95 % | HEART RATE: 67 BPM | BODY MASS INDEX: 24.22 KG/M2 | TEMPERATURE: 98.9 F | HEIGHT: 71 IN | WEIGHT: 173 LBS

## 2018-03-18 DIAGNOSIS — I49.9 CARDIAC ARRHYTHMIA, UNSPECIFIED CARDIAC ARRHYTHMIA TYPE: Primary | ICD-10-CM

## 2018-03-18 LAB
ALBUMIN SERPL-MCNC: 4.1 G/DL (ref 3.5–5)
ALBUMIN/GLOB SERPL: 1.4 G/DL (ref 1.1–2.5)
ALP SERPL-CCNC: 78 U/L (ref 24–120)
ALT SERPL W P-5'-P-CCNC: 36 U/L (ref 0–54)
ANION GAP SERPL CALCULATED.3IONS-SCNC: 11 MMOL/L (ref 4–13)
AST SERPL-CCNC: 32 U/L (ref 7–45)
BASOPHILS # BLD AUTO: 0.07 10*3/MM3 (ref 0–0.2)
BASOPHILS NFR BLD AUTO: 0.8 % (ref 0–2)
BILIRUB SERPL-MCNC: 0.3 MG/DL (ref 0.1–1)
BUN BLD-MCNC: 13 MG/DL (ref 5–21)
BUN/CREAT SERPL: 15.3 (ref 7–25)
CALCIUM SPEC-SCNC: 9.2 MG/DL (ref 8.4–10.4)
CHLORIDE SERPL-SCNC: 104 MMOL/L (ref 98–110)
CO2 SERPL-SCNC: 29 MMOL/L (ref 24–31)
CREAT BLD-MCNC: 0.85 MG/DL (ref 0.5–1.4)
D DIMER PPP FEU-MCNC: 0.66 MG/L (FEU) (ref 0–0.5)
DEPRECATED RDW RBC AUTO: 44.3 FL (ref 40–54)
EOSINOPHIL # BLD AUTO: 0.23 10*3/MM3 (ref 0–0.7)
EOSINOPHIL NFR BLD AUTO: 2.5 % (ref 0–4)
ERYTHROCYTE [DISTWIDTH] IN BLOOD BY AUTOMATED COUNT: 13.4 % (ref 12–15)
GFR SERPL CREATININE-BSD FRML MDRD: 87 ML/MIN/1.73
GLOBULIN UR ELPH-MCNC: 3 GM/DL
GLUCOSE BLD-MCNC: 131 MG/DL (ref 70–100)
HCT VFR BLD AUTO: 44.2 % (ref 40–52)
HGB BLD-MCNC: 14.8 G/DL (ref 14–18)
HOLD SPECIMEN: NORMAL
HOLD SPECIMEN: NORMAL
IMM GRANULOCYTES # BLD: 0.02 10*3/MM3 (ref 0–0.03)
IMM GRANULOCYTES NFR BLD: 0.2 % (ref 0–5)
LYMPHOCYTES # BLD AUTO: 1.83 10*3/MM3 (ref 0.72–4.86)
LYMPHOCYTES NFR BLD AUTO: 20 % (ref 15–45)
MAGNESIUM SERPL-MCNC: 2.3 MG/DL (ref 1.4–2.2)
MCH RBC QN AUTO: 30.1 PG (ref 28–32)
MCHC RBC AUTO-ENTMCNC: 33.5 G/DL (ref 33–36)
MCV RBC AUTO: 90 FL (ref 82–95)
MONOCYTES # BLD AUTO: 0.73 10*3/MM3 (ref 0.19–1.3)
MONOCYTES NFR BLD AUTO: 8 % (ref 4–12)
NEUTROPHILS # BLD AUTO: 6.29 10*3/MM3 (ref 1.87–8.4)
NEUTROPHILS NFR BLD AUTO: 68.5 % (ref 39–78)
NRBC BLD MANUAL-RTO: 0 /100 WBC (ref 0–0)
NT-PROBNP SERPL-MCNC: 112 PG/ML (ref 0–1800)
PLATELET # BLD AUTO: 199 10*3/MM3 (ref 130–400)
PMV BLD AUTO: 13.4 FL (ref 6–12)
POTASSIUM BLD-SCNC: 4.4 MMOL/L (ref 3.5–5.3)
PROT SERPL-MCNC: 7.1 G/DL (ref 6.3–8.7)
RBC # BLD AUTO: 4.91 10*6/MM3 (ref 4.8–5.9)
SODIUM BLD-SCNC: 144 MMOL/L (ref 135–145)
TROPONIN I SERPL-MCNC: <0.012 NG/ML (ref 0–0.03)
WBC NRBC COR # BLD: 9.17 10*3/MM3 (ref 4.8–10.8)
WHOLE BLOOD HOLD SPECIMEN: NORMAL
WHOLE BLOOD HOLD SPECIMEN: NORMAL

## 2018-03-18 PROCEDURE — 84484 ASSAY OF TROPONIN QUANT: CPT | Performed by: EMERGENCY MEDICINE

## 2018-03-18 PROCEDURE — 93010 ELECTROCARDIOGRAM REPORT: CPT | Performed by: INTERNAL MEDICINE

## 2018-03-18 PROCEDURE — 93005 ELECTROCARDIOGRAM TRACING: CPT | Performed by: EMERGENCY MEDICINE

## 2018-03-18 PROCEDURE — 83880 ASSAY OF NATRIURETIC PEPTIDE: CPT | Performed by: EMERGENCY MEDICINE

## 2018-03-18 PROCEDURE — 99284 EMERGENCY DEPT VISIT MOD MDM: CPT

## 2018-03-18 PROCEDURE — 80053 COMPREHEN METABOLIC PANEL: CPT | Performed by: EMERGENCY MEDICINE

## 2018-03-18 PROCEDURE — 85025 COMPLETE CBC W/AUTO DIFF WBC: CPT | Performed by: EMERGENCY MEDICINE

## 2018-03-18 PROCEDURE — 83735 ASSAY OF MAGNESIUM: CPT | Performed by: EMERGENCY MEDICINE

## 2018-03-18 PROCEDURE — 85379 FIBRIN DEGRADATION QUANT: CPT | Performed by: EMERGENCY MEDICINE

## 2018-03-18 RX ORDER — SODIUM CHLORIDE 0.9 % (FLUSH) 0.9 %
10 SYRINGE (ML) INJECTION AS NEEDED
Status: DISCONTINUED | OUTPATIENT
Start: 2018-03-18 | End: 2018-03-18 | Stop reason: HOSPADM

## 2018-03-18 RX ORDER — DILTIAZEM HYDROCHLORIDE 120 MG/1
120 CAPSULE, COATED, EXTENDED RELEASE ORAL
Status: DISCONTINUED | OUTPATIENT
Start: 2018-03-18 | End: 2018-03-18

## 2018-03-18 RX ADMIN — METOPROLOL TARTRATE 25 MG: 25 TABLET, FILM COATED ORAL at 17:02

## 2018-03-18 NOTE — ED NOTES
"Presents to ER for episode of dizziness, shaking, and diaphoresis.  Pt denies any cp or soa with with episode this am.  Pt was discharged from [facility] 2 days for same symptoms, hhe was admitted to hospital & monitored.   Had multiple test preformed & pace maker \"checked.\"   Pt was going to be discharged with new home med, pt declined med because it was not prescribed by his cardiologist.  Pt sees Dr Olson.        Jeannette Zamudio, RN  03/18/18 8151    "

## 2018-03-18 NOTE — ED PROVIDER NOTES
Subjective   Patient presents with a complaint of sudden onset of a lightheaded feeling and is also has some dizziness with it.  At that time he had some pain over his pacemaker site.  He got clammy and sweaty little nauseated.  This has passed now but he wanted to be checked.  He had a similar episode and was put in the hospital overnight in [place] a couple months ago.  Family with him says that something was running high them but she could not what was that was running.  They said they did check his pacemaker there.  The cardiologist wanted to put him on any medication but he refused because it was not his regular cardiologist.        History provided by:  Patient   used: No    Dizziness   Quality:  Lightheadedness  Severity:  Moderate  Onset quality:  Sudden  Duration:  1 hour  Timing:  Intermittent  Progression:  Resolved  Chronicity:  Recurrent  Context: not when bending over, not with bowel movement, not with ear pain, not with eye movement, not with head movement, not with inactivity, not with loss of consciousness, not with medication, not with physical activity, not when standing up and not when urinating    Relieved by:  Nothing  Worsened by:  Nothing  Ineffective treatments:  None tried  Associated symptoms: chest pain, nausea, shortness of breath and weakness    Associated symptoms: no blood in stool, no diarrhea, no headaches, no hearing loss, no palpitations, no syncope, no tinnitus, no vision changes and no vomiting    Risk factors: heart disease    Risk factors: no anemia, no hx of stroke, no hx of vertigo, no Meniere's disease, no multiple medications and no new medications        Review of Systems   HENT: Negative.  Negative for hearing loss and tinnitus.    Respiratory: Positive for shortness of breath.    Cardiovascular: Positive for chest pain. Negative for palpitations and syncope.   Gastrointestinal: Positive for nausea. Negative for blood in stool, diarrhea and  vomiting.   Genitourinary: Negative.    Musculoskeletal: Negative.    Skin: Negative.    Neurological: Positive for dizziness and weakness. Negative for headaches.   Hematological: Negative.    Psychiatric/Behavioral: Negative.    All other systems reviewed and are negative.      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   • SOB (shortness of breath)      MAY BE AN ANIGNAL EQUIVALENT       No Known Allergies    Past Surgical History:   Procedure Laterality Date   • CARDIAC ELECTROPHYSIOLOGY PROCEDURE N/A 7/26/2017    Procedure: Pacemaker SC new;  Surgeon: Andrew Germain MD;  Location:  [facility]ATH [facility];  Service:    • CHOLECYSTECTOMY     • CORONARY ARTERY BYPASS GRAFT     • HEMORRHOIDECTOMY     • HIP SURGERY     • JOINT REPLACEMENT         Family History   Problem Relation Age of Onset   • Coronary artery disease Mother        Social History     Social History   • Marital status:      Social History Main Topics   • Smoking status: Never Smoker   • Alcohol use No   • Drug use: No     Other Topics Concern   • Not on file       Prior to Admission medications    Medication Sig Start Date End Date Taking? Authorizing Provider   amLODIPine (NORVASC) 5 MG tablet TAKE 1 TABLET BY MOUTH DAILY 9/26/16  Yes Andrew Germain MD   aspirin 81 MG chewable tablet Chew 81 mg Daily.   Yes Historical Provider, MD   Omega-3 Fatty Acids (FISH OIL) 1000 MG capsule capsule Take  by mouth Daily With Breakfast.   Yes Historical Provider, MD   simvastatin (ZOCOR) 80 MG tablet Take 80 mg by mouth Every Night.   Yes Historical Provider, MD   albuterol (VENTOLIN HFA) 108 (90 BASE) MCG/ACT inhaler Inhale 2 puffs Every 4 (Four) Hours As  Needed for Wheezing.    Historical Provider, MD       Medications   sodium chloride 0.9 % flush 10 mL (not administered)   metoprolol tartrate (LOPRESSOR) tablet 25 mg (25 mg Oral Given 3/18/18 1702)       Vitals:    03/18/18 1702   BP: 139/76   Pulse: 69   Resp:    Temp:    SpO2:          Objective   Physical Exam   Constitutional: He is oriented to person, place, and time. He appears well-developed and well-nourished.   HENT:   Head: Normocephalic and atraumatic.   Nose: Nose normal.   Eyes: EOM are normal. Pupils are equal, round, and reactive to light.   Neck: Normal range of motion. Neck supple.   Cardiovascular: Normal rate and regular rhythm.    Pulmonary/Chest: Effort normal and breath sounds normal.   Abdominal: Soft. Bowel sounds are normal.   Musculoskeletal: Normal range of motion.   Neurological: He is alert and oriented to person, place, and time.   Skin: Skin is warm and dry. Capillary refill takes less than 2 seconds.   Psychiatric: He has a normal mood and affect. His behavior is normal.   Nursing note and vitals reviewed.      Procedures         Lab Results (last 24 hours)     Procedure Component Value Units Date/Time    CBC & Differential [[id number]] Collected:  03/18/18 1310    Specimen:  Blood Updated:  03/18/18 1430    Narrative:       The following orders were created for panel order CBC & Differential.  Procedure                               Abnormality         Status                     ---------                               -----------         ------                     Manual Differential[[id number]]                                                         CBC Auto Differential[[id number]]        Abnormal            Final result                 Please view results for these tests on the individual orders.    Comprehensive Metabolic Panel [[id number]]  (Abnormal) Collected:  03/18/18 1310    Specimen:  Blood Updated:  03/18/18 1427     Glucose 131 (H) mg/dL      BUN 13 mg/dL      Creatinine  0.85 mg/dL      Sodium 144 mmol/L      Potassium 4.4 mmol/L      Chloride 104 mmol/L      CO2 29.0 mmol/L      Calcium 9.2 mg/dL      Total Protein 7.1 g/dL      Albumin 4.10 g/dL      ALT (SGPT) 36 U/L      AST (SGOT) 32 U/L      Alkaline Phosphatase 78 U/L      Total Bilirubin 0.3 mg/dL      eGFR Non African Amer 87 mL/min/1.73      Globulin 3.0 gm/dL      A/G Ratio 1.4 g/dL      BUN/Creatinine Ratio 15.3     Anion Gap 11.0 mmol/L     Narrative:       The MDRD GFR formula is only valid for adults with stable renal function between ages 18 and 70.    BNP [[id number]]  (Normal) Collected:  03/18/18 1310    Specimen:  Blood Updated:  03/18/18 1439     proBNP 112.0 pg/mL     D-dimer, Quantitative [[id number]]  (Abnormal) Collected:  03/18/18 1310    Specimen:  Blood Updated:  03/18/18 1427     D-Dimer, Quantitative 0.66 (H) mg/L (FEU)     Narrative:       Reference Range is 0-0.50 mg/L FEU. However, results <0.50 mg/L FEU tends to rule out DVT or PE. Results >0.50 mg/L FEU are not useful in predicting absence or presence of DVT or PE.    Troponin [[id number]]  (Normal) Collected:  03/18/18 1310    Specimen:  Blood Updated:  03/18/18 1439     Troponin I <0.012 ng/mL     Magnesium [[id number]]  (Abnormal) Collected:  03/18/18 1310    Specimen:  Blood Updated:  03/18/18 1427     Magnesium 2.3 (H) mg/dL     CBC Auto Differential [[id number]]  (Abnormal) Collected:  03/18/18 1310    Specimen:  Blood Updated:  03/18/18 1430     WBC 9.17 10*3/mm3      RBC 4.91 10*6/mm3      Hemoglobin 14.8 g/dL      Hematocrit 44.2 %      MCV 90.0 fL      MCH 30.1 pg      MCHC 33.5 g/dL      RDW 13.4 %      RDW-SD 44.3 fl      MPV 13.4 (H) fL      Platelets 199 10*3/mm3      Neutrophil % 68.5 %      Lymphocyte % 20.0 %      Monocyte % 8.0 %      Eosinophil % 2.5 %      Basophil % 0.8 %      Immature Grans % 0.2 %      Neutrophils, Absolute 6.29 10*3/mm3      Lymphocytes, Absolute 1.83 10*3/mm3      Monocytes, Absolute 0.73 10*3/mm3       Eosinophils, Absolute 0.23 10*3/mm3      Basophils, Absolute 0.07 10*3/mm3      Immature Grans, Absolute 0.02 10*3/mm3      nRBC 0.0 /100 WBC           No orders to display       ED Course  ED Course   Comment By Time   I spoke to the about [employer] rep who did not his interrogation on the 14th in [place].  She told me that the interrogation showed periods of atrial tachycardia with rates to 120 and also showed several periods of nonsustained V. tach of 3-4 seconds.  I presume these of the episodes that were causing him to feel bad and what the cardiologist in [place] was telling him about something running too fast and try to put him on medication.  I spoke with Dr. Saha who is on-call for his regular cardiologist Dr. Germain.  Based on that conversation will start the patient on metoprolol 25 mg twice a day and given his first dose tonight.  The patient is stable right now with no signs of distress and suddenly she is stable for discharge. Bruce Chávez Jr., MD 03/18 4861          MDM  Number of Diagnoses or Management Options  Cardiac arrhythmia, unspecified cardiac arrhythmia type: new and requires workup     Amount and/or Complexity of Data Reviewed  Clinical lab tests: ordered and reviewed  Tests in the radiology section of CPT®: ordered and reviewed  Tests in the medicine section of CPT®: ordered and reviewed  Discuss the patient with other providers: yes    Risk of Complications, Morbidity, and/or Mortality  Presenting problems: moderate  Diagnostic procedures: moderate  Management options: moderate    Patient Progress  Patient progress: stable      Final diagnoses:   Cardiac arrhythmia, unspecified cardiac arrhythmia type          Bruce Chávez Jr., MD  03/18/18 7161

## 2018-03-28 ENCOUNTER — CLINICAL SUPPORT NO REQUIREMENTS (OUTPATIENT)
Dept: CARDIOLOGY | Facility: CLINIC | Age: 83
End: 2018-03-28

## 2018-03-28 ENCOUNTER — OFFICE VISIT (OUTPATIENT)
Dept: CARDIOLOGY | Facility: CLINIC | Age: 83
End: 2018-03-28

## 2018-03-28 VITALS
HEIGHT: 71 IN | WEIGHT: 173 LBS | BODY MASS INDEX: 24.22 KG/M2 | DIASTOLIC BLOOD PRESSURE: 64 MMHG | HEART RATE: 61 BPM | SYSTOLIC BLOOD PRESSURE: 130 MMHG

## 2018-03-28 DIAGNOSIS — Z95.0 PACEMAKER: Primary | ICD-10-CM

## 2018-03-28 DIAGNOSIS — I47.29 NSVT (NONSUSTAINED VENTRICULAR TACHYCARDIA) (HCC): ICD-10-CM

## 2018-03-28 DIAGNOSIS — Z95.1 S/P CABG X 4: ICD-10-CM

## 2018-03-28 DIAGNOSIS — R00.1 SYMPTOMATIC BRADYCARDIA: ICD-10-CM

## 2018-03-28 DIAGNOSIS — Z95.0 S/P CARDIAC PACEMAKER PROCEDURE: ICD-10-CM

## 2018-03-28 DIAGNOSIS — R55 NEAR SYNCOPE: ICD-10-CM

## 2018-03-28 DIAGNOSIS — I10 ESSENTIAL HYPERTENSION: ICD-10-CM

## 2018-03-28 DIAGNOSIS — I44.1 MOBITZ TYPE 2 SECOND DEGREE HEART BLOCK: ICD-10-CM

## 2018-03-28 DIAGNOSIS — I25.10 CORONARY ARTERY DISEASE INVOLVING NATIVE CORONARY ARTERY OF NATIVE HEART WITHOUT ANGINA PECTORIS: Primary | ICD-10-CM

## 2018-03-28 DIAGNOSIS — R42 DIZZINESS: ICD-10-CM

## 2018-03-28 DIAGNOSIS — E78.2 MIXED HYPERLIPIDEMIA: ICD-10-CM

## 2018-03-28 PROCEDURE — 99215 OFFICE O/P EST HI 40 MIN: CPT | Performed by: NURSE PRACTITIONER

## 2018-03-28 PROCEDURE — 93000 ELECTROCARDIOGRAM COMPLETE: CPT | Performed by: NURSE PRACTITIONER

## 2018-03-28 NOTE — PROGRESS NOTES
Subjective:     Encounter Date:03/28/2018      Patient ID: Dyllan Greene is a 82 y.o. male. He presents today with complaints of intermittent episodes of dizziness, nausea, diaphoresis and tremors that occur every other day. The episodes are not related to activity, eating, standing or any other identifiable cause. He was recently seen in the emergency department of [facility] on 3/18/18 for similar complaints and started on metoprolol tartrate 25 mg by mouth twice daily. He had been admitted for observation in [place], [place] on 3/14/18 for similar symptoms at which time his pacemaker was interrogated revealing paroxysmal atrial tachycardia as well as several episodes of non-sustained ventricular tachycardia 3-4 seconds in duration. He reports decrease in frequency of symptoms at that time. He denies chest pain, shortness of breath, palpitations, syncope, orthopnea, PND, swelling or decreased stamina. Pacemaker interrogation today in office reveals no additional NSVT or PAT since initiation of betablocker.     Chief Complaint:  Dizziness   This is a new problem. The current episode started 1 to 4 weeks ago. The problem occurs intermittently. Associated symptoms include diaphoresis and nausea. Pertinent negatives include no abdominal pain, anorexia, arthralgias, change in bowel habit, chest pain, chills, congestion, coughing, fatigue, fever, headaches, joint swelling, myalgias, neck pain, numbness, rash, sore throat, swollen glands, urinary symptoms, vertigo, visual change, vomiting or weakness. Nothing aggravates the symptoms. He has tried nothing for the symptoms.   Coronary Artery Disease   Presents for follow-up visit. Symptoms include dizziness. Pertinent negatives include no chest pain, chest pressure, chest tightness, leg swelling, muscle weakness, palpitations, shortness of breath or weight gain. Risk factors include hyperlipidemia. The symptoms have been stable. Compliance with diet is  variable. Compliance with exercise is variable. Compliance with medications is good.   Hypertension   This is a chronic problem. The current episode started more than 1 year ago. The problem is controlled. Pertinent negatives include no anxiety, blurred vision, chest pain, headaches, malaise/fatigue, neck pain, orthopnea, palpitations, peripheral edema, PND, shortness of breath or sweats. Risk factors for coronary artery disease include male gender and dyslipidemia. Current antihypertension treatment includes calcium channel blockers and beta blockers. The current treatment provides significant improvement. Hypertensive end-organ damage includes CAD/MI.   Hyperlipidemia   This is a chronic problem. The current episode started more than 1 year ago. The problem is controlled. Pertinent negatives include no chest pain, myalgias or shortness of breath. Current antihyperlipidemic treatment includes statins.       The following portions of the patient's history were reviewed and updated as appropriate: allergies, current medications, past family history, past medical history, past social history, past surgical history and problem list.     No Known Allergies   Current outpatient and discharge medications have been reconciled for the patient.  GABRIEL Vicente    Current Outpatient Prescriptions:   •  albuterol (VENTOLIN HFA) 108 (90 BASE) MCG/ACT inhaler, Inhale 2 puffs Every 4 (Four) Hours As Needed for Wheezing., Disp: , Rfl:   •  amLODIPine (NORVASC) 5 MG tablet, TAKE 1 TABLET BY MOUTH DAILY, Disp: 30 tablet, Rfl: 0  •  aspirin 81 MG chewable tablet, Chew 81 mg Daily., Disp: , Rfl:   •  metoprolol tartrate (LOPRESSOR) 25 MG tablet, Take 1 tablet by mouth 2 (Two) Times a Day., Disp: 60 tablet, Rfl: 0  •  Omega-3 Fatty Acids (FISH OIL) 1000 MG capsule capsule, Take  by mouth Daily With Breakfast., Disp: , Rfl:   •  simvastatin (ZOCOR) 80 MG tablet, Take 80 mg by mouth Every Night., Disp: , Rfl:   Past Medical  History:   Diagnosis Date   • Benign hypertension    • CAD in native artery    • Chest pain     CARDIAC AND NON-CARDIAC   • Chest tightness    • COPD (chronic obstructive pulmonary disease)    • Hyperlipidemia    • Hyperlipidemia    • Hypertension    • Myocardial infarct    • S/P CABG x 4     x3 6/20/2003   • SOB (shortness of breath)      MAY BE AN ANIGNAL EQUIVALENT     Past Surgical History:   Procedure Laterality Date   • CARDIAC ELECTROPHYSIOLOGY PROCEDURE N/A 7/26/2017    Procedure: Pacemaker SC new;  Surgeon: Andrew Germain MD;  Location: [facility] CATH INVASIVE LOCATION;  Service:    • CHOLECYSTECTOMY     • CORONARY ARTERY BYPASS GRAFT     • HEMORRHOIDECTOMY     • HIP SURGERY     • JOINT REPLACEMENT       Family History   Problem Relation Age of Onset   • Coronary artery disease Mother    • Liver disease Father      Social History     Social History   • Marital status:      Spouse name: N/A   • Number of children: N/A   • Years of education: N/A     Occupational History   • Not on file.     Social History Main Topics   • Smoking status: Former Smoker   • Smokeless tobacco: Never Used      Comment: quit 1984   • Alcohol use No   • Drug use: No   • Sexual activity: Defer     Other Topics Concern   • Not on file     Social History Narrative   • No narrative on file         Review of Systems   Constitution: Positive for diaphoresis. Negative for chills, decreased appetite, fatigue, fever, weakness, malaise/fatigue, night sweats, weight gain and weight loss.   HENT: Negative for congestion, nosebleeds and sore throat.    Eyes: Negative for blurred vision and visual disturbance.   Cardiovascular: Negative for chest pain, dyspnea on exertion, leg swelling, near-syncope, orthopnea, palpitations, paroxysmal nocturnal dyspnea and syncope.   Respiratory: Negative for chest tightness, cough, hemoptysis, shortness of breath, snoring and wheezing.    Endocrine: Negative for cold intolerance and heat  "intolerance.   Hematologic/Lymphatic: Does not bruise/bleed easily.   Skin: Negative for rash.   Musculoskeletal: Negative for arthralgias, back pain, falls, joint swelling, muscle weakness, myalgias and neck pain.   Gastrointestinal: Positive for nausea. Negative for abdominal pain, anorexia, change in bowel habit, constipation, diarrhea, dysphagia, heartburn and vomiting.   Genitourinary: Negative for hematuria.   Neurological: Positive for dizziness. Negative for headaches, light-headedness, numbness and vertigo.   Psychiatric/Behavioral: Negative for altered mental status.   Allergic/Immunologic: Negative for persistent infections.         ECG 12 Lead  Date/Time: 3/28/2018 1:32 PM  Performed by: BABAK WEBBER  Authorized by: BABAK WEBBER   Comparison: compared with previous ECG from 3/22/2018  Rhythm: paced  Rate: normal  BPM: 61  Clinical impression: abnormal ECG              /64   Pulse 61   Ht 180.3 cm (71\")   Wt 78.5 kg (173 lb)   BMI 24.13 kg/m²     Objective:     Physical Exam   Constitutional: He is oriented to person, place, and time. Vital signs are normal. He appears well-developed and well-nourished. No distress.   HENT:   Head: Normocephalic and atraumatic.   Right Ear: External ear normal.   Left Ear: External ear normal.   Eyes: Conjunctivae are normal. Pupils are equal, round, and reactive to light. Right eye exhibits no discharge. Left eye exhibits no discharge.   Neck: Normal range of motion. Neck supple. No JVD present. Carotid bruit is not present. No thyromegaly present.   Cardiovascular: Normal rate, regular rhythm, normal heart sounds and intact distal pulses.  PMI is not displaced.  Exam reveals no gallop, no friction rub and no decreased pulses.    No murmur heard.  Pulses:       Radial pulses are 2+ on the right side, and 2+ on the left side.        Dorsalis pedis pulses are 2+ on the right side, and 2+ on the left side.        Posterior tibial pulses are 2+ on the right " side, and 2+ on the left side.   Pulmonary/Chest: Effort normal and breath sounds normal. No respiratory distress. He has no decreased breath sounds. He has no wheezes. He has no rhonchi. He has no rales. He exhibits no tenderness.   Abdominal: Soft. Bowel sounds are normal. He exhibits no distension. There is no tenderness.   Musculoskeletal: Normal range of motion. He exhibits no edema.   Neurological: He is alert and oriented to person, place, and time.   Skin: Skin is warm and dry. No rash noted. He is not diaphoretic. No erythema. No pallor.   Psychiatric: He has a normal mood and affect. His behavior is normal. Judgment and thought content normal.   Vitals reviewed.      Lab Review:   Lab Results   Component Value Date    WBC 9.17 03/18/2018    HGB 14.8 03/18/2018    HCT 44.2 03/18/2018    MCV 90.0 03/18/2018     03/18/2018     Lab Results   Component Value Date    GLUCOSE 131 (H) 03/18/2018    BUN 13 03/18/2018    CREATININE 0.85 03/18/2018    EGFRIFNONA 87 03/18/2018    BCR 15.3 03/18/2018    K 4.4 03/18/2018    CO2 29.0 03/18/2018    CALCIUM 9.2 03/18/2018    ALBUMIN 4.10 03/18/2018    LABIL2 1.4 03/18/2018    AST 32 03/18/2018    ALT 36 03/18/2018         Assessment:          Diagnosis Plan   1. Coronary artery disease involving native coronary artery of native heart without angina pectoris  ECG 12 Lead    Adult Stress Echo W/ Cont or Stress Agent if Necessary Per Protocol   2. S/P CABG x 4  Adult Stress Echo W/ Cont or Stress Agent if Necessary Per Protocol   3. Mobitz type 2 second degree heart block     4. Symptomatic bradycardia     5. S/P cardiac pacemaker procedure  Interrogated in office today. No additional episodes of PAT or NSVT since initiation of BB on 3/18/18.    6. Essential hypertension  Well controlled.    7. Mixed hyperlipidemia  Managed by PCP. On statin.    8. Dizziness     9. NSVT (nonsustained ventricular tachycardia)  Adult Stress Echo W/ Cont or Stress Agent if Necessary Per  Protocol   10. Near syncope  Adult Transthoracic Echo Complete W/ Cont if Necessary Per Protocol          Plan:       1. 2D echo.  2. Dobutamine stress echo.  3. Continue medications as previously prescribed.  4. Report any worsening symptoms.  5. Follow up with PCP for blood pressure and cholesterol management and routine lab work.  6. Follow up with mid-level provider in two weeks, or sooner if needed.

## 2018-03-29 NOTE — PROGRESS NOTES
Dual Chamber Pacemaker Evaluation Report  IN OFFICE INTERROGATION    MARCH 28, 2018    Primary Cardiologist: Jessa  : Guidant Model: [employer] MRI  Implant date: 07/26/2017    Reason for evaluation: provider requested, GABRIEL Ambrose  Indication for pacemaker: bradycardia and mobitz type 2 second degree heart block    Measurements  Atrial sensing - P wave: 7.2 mV  Atrial threshold: 1.2V@ 0.4ms  Atrial lead impedance: 698 ohms  Ventricular sensing - R wave: 13.2 mV  Ventricular threshold: 0.7 V @ 0.4 ms  Ventricular lead impedance:   736 ohms     Diagnostic Data  Atrial paced: 34 %  Ventricular paced: 100 %    Episodes since 01/30/2018  5 episodes of PMT, all appear to be sinus tachycardia at max tracking rate of 120 bpm  02/09/2018 1 ATR episode:  Average ventricular rate 75 bpm, duration 3 seconds  03/12/2018 1 NonSustV episode:  Average ventricular rate 196 bpm, duration 19 seconds.  Discussed with GABRIEL Ambrose, in clinic.    Battery status: satisfactory   Est 13.5 years remaining      Final Parameters  Mode:  DDDR  Lower rate: 60 bpm   Upper rate: 120 bpm  AV Delay: paced- 120-180 ms  Vwnpdq-[id number] ms  Atrial - Amplitude: 2.4 V   Pulse width: 0.4 ms   Sensitivity: 0.25 mV     Ventricular - Amplitude: 1.2 V  Pulse width: 0.4 ms  Sensitivity: 0.6 mV    Changes made: No changes made.  Conclusions: normal pacemaker function, stable pacing and sensing thresholds and adequate battery reserve    Follow up: 6 months remote, 1 year in office    Most recent in office interrogation on 01/30/2018:  Not billable

## 2018-04-09 ENCOUNTER — HOSPITAL ENCOUNTER (OUTPATIENT)
Dept: CARDIOLOGY | Facility: HOSPITAL | Age: 83
Discharge: HOME | End: 2018-04-09
Admitting: NURSE PRACTITIONER

## 2018-04-09 ENCOUNTER — HOSPITAL ENCOUNTER (OUTPATIENT)
Dept: CARDIOLOGY | Facility: HOSPITAL | Age: 83
Discharge: HOME | End: 2018-04-09

## 2018-04-09 VITALS
SYSTOLIC BLOOD PRESSURE: 140 MMHG | DIASTOLIC BLOOD PRESSURE: 69 MMHG | WEIGHT: 173 LBS | BODY MASS INDEX: 24.22 KG/M2 | HEIGHT: 71 IN

## 2018-04-09 VITALS
HEIGHT: 71 IN | HEART RATE: 60 BPM | SYSTOLIC BLOOD PRESSURE: 135 MMHG | BODY MASS INDEX: 24.22 KG/M2 | WEIGHT: 173 LBS | DIASTOLIC BLOOD PRESSURE: 65 MMHG

## 2018-04-09 DIAGNOSIS — R55 NEAR SYNCOPE: ICD-10-CM

## 2018-04-09 DIAGNOSIS — Z95.1 S/P CABG X 4: ICD-10-CM

## 2018-04-09 DIAGNOSIS — I25.10 CORONARY ARTERY DISEASE INVOLVING NATIVE CORONARY ARTERY OF NATIVE HEART WITHOUT ANGINA PECTORIS: ICD-10-CM

## 2018-04-09 DIAGNOSIS — I47.29 NSVT (NONSUSTAINED VENTRICULAR TACHYCARDIA) (HCC): ICD-10-CM

## 2018-04-09 PROCEDURE — 25010000003 DOBUTAMINE PER 250 MG: Performed by: INTERNAL MEDICINE

## 2018-04-09 PROCEDURE — 93306 TTE W/DOPPLER COMPLETE: CPT

## 2018-04-09 PROCEDURE — 93350 STRESS TTE ONLY: CPT | Performed by: INTERNAL MEDICINE

## 2018-04-09 PROCEDURE — 93352 ADMIN ECG CONTRAST AGENT: CPT | Performed by: INTERNAL MEDICINE

## 2018-04-09 PROCEDURE — 25010000002 PERFLUTREN 6.52 MG/ML SUSPENSION: Performed by: INTERNAL MEDICINE

## 2018-04-09 PROCEDURE — 93306 TTE W/DOPPLER COMPLETE: CPT | Performed by: INTERNAL MEDICINE

## 2018-04-09 PROCEDURE — 93017 CV STRESS TEST TRACING ONLY: CPT

## 2018-04-09 PROCEDURE — 93350 STRESS TTE ONLY: CPT

## 2018-04-09 PROCEDURE — 93018 CV STRESS TEST I&R ONLY: CPT | Performed by: INTERNAL MEDICINE

## 2018-04-09 RX ORDER — DOBUTAMINE HYDROCHLORIDE 100 MG/100ML
10-50 INJECTION INTRAVENOUS CONTINUOUS
Status: DISCONTINUED | OUTPATIENT
Start: 2018-04-09 | End: 2018-04-10 | Stop reason: HOSPADM

## 2018-04-09 RX ADMIN — PERFLUTREN 8.48 MG: 6.52 INJECTION, SUSPENSION INTRAVENOUS at 14:25

## 2018-04-09 RX ADMIN — ATROPINE SULFATE 0.3 MG: 0.1 INJECTION PARENTERAL at 14:53

## 2018-04-09 RX ADMIN — DOBUTAMINE 10 MCG/KG/MIN: 12.5 INJECTION, SOLUTION, CONCENTRATE INTRAVENOUS at 14:25

## 2018-04-10 LAB
BH CV ECHO MEAS - AI DEC SLOPE: 58.6 CM/SEC^2
BH CV ECHO MEAS - AI MAX PG: 13 MMHG
BH CV ECHO MEAS - AI MAX VEL: 180 CM/SEC
BH CV ECHO MEAS - AI P1/2T: 899.7 MSEC
BH CV ECHO MEAS - AO MAX PG (FULL): 3.2 MMHG
BH CV ECHO MEAS - AO MAX PG: 6.5 MMHG
BH CV ECHO MEAS - AO MEAN PG (FULL): 1 MMHG
BH CV ECHO MEAS - AO MEAN PG: 3 MMHG
BH CV ECHO MEAS - AO ROOT AREA (BSA CORRECTED): 1.3
BH CV ECHO MEAS - AO ROOT AREA: 4.9 CM^2
BH CV ECHO MEAS - AO ROOT DIAM: 2.5 CM
BH CV ECHO MEAS - AO V2 MAX: 127 CM/SEC
BH CV ECHO MEAS - AO V2 MEAN: 84.5 CM/SEC
BH CV ECHO MEAS - AO V2 VTI: 29.9 CM
BH CV ECHO MEAS - AVA(I,A): 1.8 CM^2
BH CV ECHO MEAS - AVA(I,D): 1.8 CM^2
BH CV ECHO MEAS - AVA(V,A): 1.8 CM^2
BH CV ECHO MEAS - AVA(V,D): 1.8 CM^2
BH CV ECHO MEAS - BSA(HAYCOCK): 2 M^2
BH CV ECHO MEAS - BSA: 2 M^2
BH CV ECHO MEAS - BZI_BMI: 24.1 KILOGRAMS/M^2
BH CV ECHO MEAS - BZI_METRIC_HEIGHT: 180.3 CM
BH CV ECHO MEAS - BZI_METRIC_WEIGHT: 78.5 KG
BH CV ECHO MEAS - CONTRAST EF (2CH): 62.4 ML/M^2
BH CV ECHO MEAS - CONTRAST EF 4CH: 68.4 ML/M^2
BH CV ECHO MEAS - EDV(CUBED): 89.9 ML
BH CV ECHO MEAS - EDV(MOD-SP2): 42.5 ML
BH CV ECHO MEAS - EDV(MOD-SP4): 41.2 ML
BH CV ECHO MEAS - EDV(TEICH): 91.5 ML
BH CV ECHO MEAS - EF(CUBED): 63.2 %
BH CV ECHO MEAS - EF(MOD-SP2): 62.4 %
BH CV ECHO MEAS - EF(MOD-SP4): 68.4 %
BH CV ECHO MEAS - EF(TEICH): 54.9 %
BH CV ECHO MEAS - ESV(CUBED): 33.1 ML
BH CV ECHO MEAS - ESV(MOD-SP2): 16 ML
BH CV ECHO MEAS - ESV(MOD-SP4): 13 ML
BH CV ECHO MEAS - ESV(TEICH): 41.3 ML
BH CV ECHO MEAS - FS: 28.3 %
BH CV ECHO MEAS - IVS/LVPW: 1.1
BH CV ECHO MEAS - IVSD: 0.87 CM
BH CV ECHO MEAS - LA DIMENSION: 3.4 CM
BH CV ECHO MEAS - LA/AO: 1.4
BH CV ECHO MEAS - LV DIASTOLIC VOL/BSA (35-75): 20.8 ML/M^2
BH CV ECHO MEAS - LV MASS(C)D: 120.1 GRAMS
BH CV ECHO MEAS - LV MASS(C)DI: 60.6 GRAMS/M^2
BH CV ECHO MEAS - LV MAX PG: 3.3 MMHG
BH CV ECHO MEAS - LV MEAN PG: 2 MMHG
BH CV ECHO MEAS - LV SYSTOLIC VOL/BSA (12-30): 6.6 ML/M^2
BH CV ECHO MEAS - LV V1 MAX: 90.2 CM/SEC
BH CV ECHO MEAS - LV V1 MEAN: 62.9 CM/SEC
BH CV ECHO MEAS - LV V1 VTI: 21.4 CM
BH CV ECHO MEAS - LVIDD: 4.5 CM
BH CV ECHO MEAS - LVIDS: 3.2 CM
BH CV ECHO MEAS - LVLD AP2: 7.1 CM
BH CV ECHO MEAS - LVLD AP4: 6.7 CM
BH CV ECHO MEAS - LVLS AP2: 6.2 CM
BH CV ECHO MEAS - LVLS AP4: 5.4 CM
BH CV ECHO MEAS - LVOT AREA (M): 2.5 CM^2
BH CV ECHO MEAS - LVOT AREA: 2.5 CM^2
BH CV ECHO MEAS - LVOT DIAM: 1.8 CM
BH CV ECHO MEAS - LVPWD: 0.81 CM
BH CV ECHO MEAS - MV A MAX VEL: 72.3 CM/SEC
BH CV ECHO MEAS - MV DEC TIME: 0.24 SEC
BH CV ECHO MEAS - MV E MAX VEL: 88.3 CM/SEC
BH CV ECHO MEAS - MV E/A: 1.2
BH CV ECHO MEAS - RAP SYSTOLE: 5 MMHG
BH CV ECHO MEAS - RVSP: 32.5 MMHG
BH CV ECHO MEAS - SI(AO): 74 ML/M^2
BH CV ECHO MEAS - SI(CUBED): 28.7 ML/M^2
BH CV ECHO MEAS - SI(LVOT): 27.5 ML/M^2
BH CV ECHO MEAS - SI(MOD-SP2): 13.4 ML/M^2
BH CV ECHO MEAS - SI(MOD-SP4): 14.2 ML/M^2
BH CV ECHO MEAS - SI(TEICH): 25.3 ML/M^2
BH CV ECHO MEAS - SV(AO): 146.8 ML
BH CV ECHO MEAS - SV(CUBED): 56.8 ML
BH CV ECHO MEAS - SV(LVOT): 54.5 ML
BH CV ECHO MEAS - SV(MOD-SP2): 26.5 ML
BH CV ECHO MEAS - SV(MOD-SP4): 28.2 ML
BH CV ECHO MEAS - SV(TEICH): 50.2 ML
BH CV ECHO MEAS - TR MAX VEL: 262 CM/SEC
BH CV STRESS BP STAGE 1: NORMAL
BH CV STRESS BP STAGE 2: NORMAL
BH CV STRESS BP STAGE 3: NORMAL
BH CV STRESS BP STAGE 4: NORMAL
BH CV STRESS DOSE DOBUTAMINE STAGE 1: 10
BH CV STRESS DOSE DOBUTAMINE STAGE 2: 20
BH CV STRESS DOSE DOBUTAMINE STAGE 3: 30
BH CV STRESS DOSE DOBUTAMINE STAGE 4: 40
BH CV STRESS DURATION MIN STAGE 1: 3
BH CV STRESS DURATION MIN STAGE 2: 3
BH CV STRESS DURATION MIN STAGE 3: 3
BH CV STRESS DURATION MIN STAGE 4: 2
BH CV STRESS DURATION SEC STAGE 1: 0
BH CV STRESS DURATION SEC STAGE 2: 0
BH CV STRESS DURATION SEC STAGE 3: 0
BH CV STRESS DURATION SEC STAGE 4: 33
BH CV STRESS HR STAGE 1: 60
BH CV STRESS HR STAGE 2: 65
BH CV STRESS HR STAGE 3: 87
BH CV STRESS HR STAGE 4: 120
BH CV STRESS PROTOCOL 1: NORMAL
BH CV STRESS RECOVERY BP: NORMAL MMHG
BH CV STRESS RECOVERY HR: 80 BPM
BH CV STRESS STAGE 1: 1
BH CV STRESS STAGE 2: 2
BH CV STRESS STAGE 3: 3
BH CV STRESS STAGE 4: 4
E/E' RATIO: 14.5
LEFT ATRIUM VOLUME INDEX: 17.2 ML/M2
LEFT ATRIUM VOLUME: 34 CM3
MAXIMAL PREDICTED HEART RATE: 139 BPM
MAXIMAL PREDICTED HEART RATE: 139 BPM
PERCENT MAX PREDICTED HR: 86.33 %
STRESS BASELINE BP: NORMAL MMHG
STRESS BASELINE HR: 60 BPM
STRESS PERCENT HR: 102 %
STRESS POST EXERCISE DUR MIN: 11 MIN
STRESS POST EXERCISE DUR SEC: 33 SEC
STRESS POST PEAK BP: NORMAL MMHG
STRESS POST PEAK HR: 120 BPM
STRESS TARGET HR: 118 BPM
STRESS TARGET HR: 118 BPM

## 2018-04-16 ENCOUNTER — CLINICAL SUPPORT NO REQUIREMENTS (OUTPATIENT)
Dept: CARDIOLOGY | Facility: CLINIC | Age: 83
End: 2018-04-16

## 2018-04-16 ENCOUNTER — OFFICE VISIT (OUTPATIENT)
Dept: CARDIOLOGY | Facility: CLINIC | Age: 83
End: 2018-04-16

## 2018-04-16 ENCOUNTER — HOSPITAL ENCOUNTER (EMERGENCY)
Facility: HOSPITAL | Age: 83
Discharge: HOME | End: 2018-04-16
Attending: FAMILY MEDICINE | Admitting: FAMILY MEDICINE

## 2018-04-16 VITALS
BODY MASS INDEX: 24.22 KG/M2 | HEART RATE: 87 BPM | HEIGHT: 71 IN | DIASTOLIC BLOOD PRESSURE: 70 MMHG | WEIGHT: 173 LBS | SYSTOLIC BLOOD PRESSURE: 130 MMHG

## 2018-04-16 VITALS
SYSTOLIC BLOOD PRESSURE: 135 MMHG | DIASTOLIC BLOOD PRESSURE: 72 MMHG | HEIGHT: 71 IN | HEART RATE: 79 BPM | BODY MASS INDEX: 23.94 KG/M2 | RESPIRATION RATE: 14 BRPM | OXYGEN SATURATION: 97 % | TEMPERATURE: 98.4 F | WEIGHT: 171 LBS

## 2018-04-16 DIAGNOSIS — E78.2 MIXED HYPERLIPIDEMIA: ICD-10-CM

## 2018-04-16 DIAGNOSIS — Z95.0 PACEMAKER: Primary | ICD-10-CM

## 2018-04-16 DIAGNOSIS — R42 DIZZINESS: ICD-10-CM

## 2018-04-16 DIAGNOSIS — I44.1 MOBITZ TYPE 2 SECOND DEGREE HEART BLOCK: ICD-10-CM

## 2018-04-16 DIAGNOSIS — I25.10 CORONARY ARTERY DISEASE INVOLVING NATIVE CORONARY ARTERY OF NATIVE HEART WITHOUT ANGINA PECTORIS: Primary | ICD-10-CM

## 2018-04-16 DIAGNOSIS — R00.1 SYMPTOMATIC BRADYCARDIA: ICD-10-CM

## 2018-04-16 DIAGNOSIS — I47.29 NSVT (NONSUSTAINED VENTRICULAR TACHYCARDIA) (HCC): ICD-10-CM

## 2018-04-16 DIAGNOSIS — I10 ESSENTIAL HYPERTENSION: ICD-10-CM

## 2018-04-16 DIAGNOSIS — Z95.1 S/P CABG X 4: ICD-10-CM

## 2018-04-16 DIAGNOSIS — Z95.0 S/P CARDIAC PACEMAKER PROCEDURE: ICD-10-CM

## 2018-04-16 DIAGNOSIS — R11.2 NON-INTRACTABLE VOMITING WITH NAUSEA, UNSPECIFIED VOMITING TYPE: Primary | ICD-10-CM

## 2018-04-16 PROCEDURE — 99283 EMERGENCY DEPT VISIT LOW MDM: CPT

## 2018-04-16 PROCEDURE — 99214 OFFICE O/P EST MOD 30 MIN: CPT | Performed by: NURSE PRACTITIONER

## 2018-04-16 PROCEDURE — 93280 PM DEVICE PROGR EVAL DUAL: CPT | Performed by: INTERNAL MEDICINE

## 2018-04-16 RX ORDER — BUSPIRONE HYDROCHLORIDE 5 MG/1
5 TABLET ORAL 2 TIMES DAILY
COMMUNITY
End: 2018-05-15

## 2018-04-16 NOTE — PROGRESS NOTES
Subjective:     Encounter Date:04/16/2018      Patient ID: Dyllan Greene is a 82 y.o. male. He presents today for two week follow-up of 2D echo and lexiscan, which were both normal tests. He has been admitted to [facility] since his last visit for a near-syncopal episode. Workup including EKG, chest xray, CT of head and labs were all reviewed and were negative for any acute processes. Pt's granddaughter is present for visit today and reports that orthostatic blood pressures were done as well and were normal. Previous to this, he was seen in the emergency department of [facility] on 3/18/18 for complaints of episodes of dizziness, nausea, diaphoresis and tremors and started on metoprolol tartrate 25 mg by mouth twice daily. He had been admitted for observation in [place], [place] on 3/14/18 for similar symptoms at which time his pacemaker was interrogated revealing paroxysmal atrial tachycardia as well as several episodes of non-sustained ventricular tachycardia 3-4 seconds in duration. Pacemaker interrogation at last office visit revealed no additional episodes of NSVT since initiation of betablocker. He continues to complain of episodes of diaphoresis, blurred vision, dizziness, sharp left-sided chest pain that lasts seconds at a time and tremors that have increased in frequency since his last visit. He He has a history of coronary artery disease s/p coronary artery bypass grafting X4, mobitz type 2 second degree heart block s/p pacemaker, hypertension and hyperlipidemia.     Chief Complaint:  Coronary Artery Disease   Presents for follow-up visit. Symptoms include chest pain and dizziness. Pertinent negatives include no chest pressure, chest tightness, leg swelling, muscle weakness, palpitations, shortness of breath or weight gain. Risk factors include hyperlipidemia. The symptoms have been stable. Compliance with diet is variable. Compliance with exercise is variable. Compliance  with medications is good.   Hypertension   This is a chronic problem. The current episode started more than 1 year ago. The problem is controlled. Associated symptoms include blurred vision, chest pain and sweats. Pertinent negatives include no anxiety, headaches, malaise/fatigue, neck pain, orthopnea, palpitations, peripheral edema, PND or shortness of breath. Risk factors for coronary artery disease include male gender and dyslipidemia. Current antihypertension treatment includes beta blockers and calcium channel blockers. The current treatment provides significant improvement. Hypertensive end-organ damage includes CAD/MI.   Hyperlipidemia   This is a chronic problem. The current episode started more than 1 year ago. The problem is controlled. Associated symptoms include chest pain. Pertinent negatives include no shortness of breath. Current antihyperlipidemic treatment includes statins.       The following portions of the patient's history were reviewed and updated as appropriate: allergies, current medications, past family history, past medical history, past social history, past surgical history and problem list.     No Known Allergies    Current Outpatient Prescriptions:   •  albuterol (VENTOLIN HFA) 108 (90 BASE) MCG/ACT inhaler, Inhale 2 puffs Every 4 (Four) Hours As Needed for Wheezing., Disp: , Rfl:   •  amLODIPine (NORVASC) 5 MG tablet, TAKE 1 TABLET BY MOUTH DAILY, Disp: 30 tablet, Rfl: 0  •  aspirin 81 MG chewable tablet, Chew 81 mg Daily., Disp: , Rfl:   •  busPIRone (BUSPAR) 5 MG tablet, Take 5 mg by mouth 2 (Two) Times a Day., Disp: , Rfl:   •  metoprolol tartrate (LOPRESSOR) 25 MG tablet, Take 1 tablet by mouth 2 (Two) Times a Day., Disp: 60 tablet, Rfl: 0  •  Omega-3 Fatty Acids (FISH OIL) 1000 MG capsule capsule, Take  by mouth Daily With Breakfast., Disp: , Rfl:   •  simvastatin (ZOCOR) 80 MG tablet, Take 80 mg by mouth Every Night., Disp: , Rfl:   Past Medical History:   Diagnosis Date   •  Asthma    • Benign hypertension    • CAD in native artery    • Chest pain     CARDIAC AND NON-CARDIAC   • Chest tightness    • COPD (chronic obstructive pulmonary disease)    • Hyperlipidemia    • Hyperlipidemia    • Hypertension    • Myocardial infarct    • S/P CABG x 4     x3 6/20/2003   • SOB (shortness of breath)      MAY BE AN ANIGNAL EQUIVALENT     Past Surgical History:   Procedure Laterality Date   • CARDIAC ELECTROPHYSIOLOGY PROCEDURE N/A 7/26/2017    Procedure: Pacemaker SC new;  Surgeon: Andrew Germain MD;  Location: [facility] CATH INVASIVE LOCATION;  Service:    • CHOLECYSTECTOMY     • CORONARY ARTERY BYPASS GRAFT     • HEMORRHOIDECTOMY     • HIP SURGERY     • JOINT REPLACEMENT       Family History   Problem Relation Age of Onset   • Coronary artery disease Mother    • Liver disease Father      Social History     Social History   • Marital status:      Spouse name: N/A   • Number of children: N/A   • Years of education: N/A     Occupational History   • Not on file.     Social History Main Topics   • Smoking status: Former Smoker   • Smokeless tobacco: Never Used      Comment: quit 1984   • Alcohol use No   • Drug use: No   • Sexual activity: Defer     Other Topics Concern   • Not on file     Social History Narrative   • No narrative on file         Review of Systems   Constitution: Positive for diaphoresis. Negative for chills, decreased appetite, fever, weakness, malaise/fatigue, night sweats, weight gain and weight loss.   HENT: Negative for nosebleeds.    Eyes: Positive for blurred vision. Negative for visual disturbance.   Cardiovascular: Positive for chest pain and near-syncope. Negative for dyspnea on exertion, leg swelling, orthopnea, palpitations, paroxysmal nocturnal dyspnea and syncope.   Respiratory: Negative for chest tightness, cough, hemoptysis, shortness of breath, snoring and wheezing.    Endocrine: Negative for cold intolerance and heat intolerance.   Hematologic/Lymphatic:  "Does not bruise/bleed easily.   Skin: Negative for rash.   Musculoskeletal: Negative for back pain, falls, muscle weakness and neck pain.   Gastrointestinal: Negative for abdominal pain, change in bowel habit, constipation, diarrhea, dysphagia, heartburn, nausea and vomiting.   Genitourinary: Negative for hematuria.   Neurological: Positive for dizziness and tremors. Negative for headaches and light-headedness.   Psychiatric/Behavioral: Negative for altered mental status.   Allergic/Immunologic: Negative for persistent infections.       Procedures  /70 (Patient Position: Standing)   Pulse 87   Ht 180.3 cm (71\")   Wt 78.5 kg (173 lb)   BMI 24.13 kg/m²        Objective:     Physical Exam   Constitutional: He is oriented to person, place, and time. Vital signs are normal. He appears well-developed and well-nourished. No distress.   HENT:   Head: Normocephalic and atraumatic.   Right Ear: External ear normal.   Left Ear: External ear normal.   Eyes: Conjunctivae are normal. Pupils are equal, round, and reactive to light. Right eye exhibits no discharge. Left eye exhibits no discharge.   Neck: Normal range of motion. Neck supple. No JVD present. Carotid bruit is not present. No thyromegaly present.   Cardiovascular: Normal rate, regular rhythm, normal heart sounds and intact distal pulses.  PMI is not displaced.  Exam reveals no gallop, no friction rub and no decreased pulses.    No murmur heard.  Pulses:       Radial pulses are 2+ on the right side, and 2+ on the left side.        Dorsalis pedis pulses are 2+ on the right side, and 2+ on the left side.        Posterior tibial pulses are 2+ on the right side, and 2+ on the left side.   Pulmonary/Chest: Effort normal and breath sounds normal. No respiratory distress. He has no decreased breath sounds. He has no wheezes. He has no rhonchi. He has no rales. He exhibits no tenderness.   Abdominal: Soft. Bowel sounds are normal. He exhibits no distension. There is " no tenderness.   Musculoskeletal: Normal range of motion. He exhibits no edema.   Neurological: He is alert and oriented to person, place, and time.   Skin: Skin is warm and dry. No rash noted. He is not diaphoretic. No erythema. No pallor.   Psychiatric: He has a normal mood and affect. His behavior is normal. Judgment and thought content normal.   Vitals reviewed.      Lab Review:   Lab Results   Component Value Date    WBC 9.17 03/18/2018    HGB 14.8 03/18/2018    HCT 44.2 03/18/2018    MCV 90.0 03/18/2018     03/18/2018     Lab Results   Component Value Date    GLUCOSE 131 (H) 03/18/2018    BUN 13 03/18/2018    CREATININE 0.85 03/18/2018    EGFRIFNONA 87 03/18/2018    BCR 15.3 03/18/2018    K 4.4 03/18/2018    CO2 29.0 03/18/2018    CALCIUM 9.2 03/18/2018    ALBUMIN 4.10 03/18/2018    LABIL2 1.4 03/18/2018    AST 32 03/18/2018    ALT 36 03/18/2018         Assessment:          Diagnosis Plan   1. Coronary artery disease involving native coronary artery of native heart without angina pectoris  Recent 2D echo and Lexiscan normal.    2. S/P CABG x 4  Continues on aspirin, BB and statin.    3. Essential hypertension  Well controlled.  Monitor and record daily blood pressure. Report readings consistently higher than 140/90 or consistently lower than 100/60.      4. Mixed hyperlipidemia  Managed by PCP. On statin.    5. Mobitz type 2 second degree heart block  Paced.    6. NSVT (nonsustained ventricular tachycardia)  No additional episodes of NSVT since initiation of betablocker 3/14/18.    7. Symptomatic bradycardia  Paced.    8. S/P cardiac pacemaker procedure  Pacemaker interrogated today revealing increase in atrial pacing from 34% to 91%. Due for recheck in 09/18.    9. Dizziness  US Carotid Bilateral          Plan:       1. Bilateral carotid artery ultrasound.  2. Continue medications as previously prescribed.  3. Report any worsening symptoms.  4. Follow up with PCP for blood pressure and cholesterol  management and routine lab work.  5. Follow up with pacemaker clinic as scheduled.   6. Follow up with Dr. Germain in one month, or sooner if needed.   7.  Monitor and record daily blood pressure. Report readings consistently higher than 140/90 or consistently lower than 100/60.

## 2018-04-17 NOTE — ED PROVIDER NOTES
[facility]  eMERGENCY dEPARTMENT eNCOUnter      Pt Name: Dyllan Greene  MRN: [id number]  Birthdate 1935  Date of evaluation: 4/16/2018      CHIEF COMPLAINT       Chief Complaint   Patient presents with   • sent for testing       Nurses Notes reviewed and I agree except as noted in the HPI.      HISTORY OF PRESENT ILLNESS    Dyllan Greene is a 82 y.o. male who presents     Patient presents for testing.  Patient was recently hospitalized at [facility] for nausea vomiting and diaphoresis.  He was treated there and then discharged today so that he could keep an appointment with his cardiologist.  He went to cardiology they sent him for an ultrasound and the ultrasound person told him that they could not fit him in in time for them to be able to leave on time and so they told them to come to the ER so that they can get an ultrasound that way.  Patient has no complaints at this time.  He states that he felt pretty good.  No treatment prior to arrival.       REVIEW OF SYSTEMS     Review of Systems  CONSTITUTION: No Fever, No chills, No activity change, No diaphoresis, No unexpected wt change.      RESPIRATORY: No apnea, no chest tightness, no cough, no shortness of breath, no stridor, no wheezing  CARDIOVASCULAR: No chest pain, no palpitations, no leg swelling  GI: No abdominal distention, no abdominal pain, no rectal bleeding, no melena, no hematochezia, no diarrhea, no nausea, no vomiting  ENDOCRINE: No polydipsia, no polyphagia, no polyuria, no cold or heat intolerance  : No difficulty urinating, no dyspareunia, no dysuria, no flank pain, no frequency, no genital sore, no hematuria, no menstrual problem if female, no decreased urination, no hesitation of urination, no vaginal discharge if female, no vaginal pain if female no penile pain if male  ALLERG/IMMUNO:  No env or food allergies, not immunocompromised  NEUROLOGICAL: No dizziness, no facial asymmetry, no Headaches, no  Light-headedness, no numbness nor paresthesias, no seizures, no speech difficulty, No syncope, no tremors, no weakness  HEMATOLOGIC: No adenopathy, no unusual bleeding or bruising  MUSC: No arthralgia, no back pain, no joint swelling, no myalgias, no neck pain, no neck stiffness  SKIN: No rash, no color change, no pallor, no wound  PSYCH: No agitation, no behavior problem, no confusion, no decreased concentration, no hallucinations, no suicidal ideation, no homicidal ideation, no self injury, no sleep disturbance  All other systems negative.    PAST MEDICAL HISTORY     Past Medical History:   Diagnosis Date   • Asthma    • Benign hypertension    • CAD in native artery    • Chest pain     CARDIAC AND NON-CARDIAC   • Chest tightness    • COPD (chronic obstructive pulmonary disease)    • Hyperlipidemia    • Hyperlipidemia    • Hypertension    • Myocardial infarct    • S/P CABG x 4     x3 2003   • SOB (shortness of breath)      MAY BE AN ANIGNAL EQUIVALENT       SURGICAL HISTORY      has a past surgical history that includes Coronary artery bypass graft; Hip surgery; Hemorrhoid surgery; Joint replacement; Cholecystectomy; and Cardiac electrophysiology procedure (N/A, 2017).    CURRENT MEDICATIONS        Medication List      CONTINUE taking these medications    amLODIPine 5 MG tablet  Commonly known as:  NORVASC  TAKE 1 TABLET BY MOUTH DAILY     aspirin 81 MG chewable tablet     busPIRone 5 MG tablet  Commonly known as:  BUSPAR     fish oil 1000 MG capsule capsule     metoprolol tartrate 25 MG tablet  Commonly known as:  LOPRESSOR  Take 1 tablet by mouth 2 (Two) Times a Day.     simvastatin 80 MG tablet  Commonly known as:  ZOCOR     VENTOLIN  (90 Base) MCG/ACT inhaler  Generic drug:  albuterol          ALLERGIES     has No Known Allergies.    FAMILY HISTORY     indicated that his mother is . He indicated that his father is .    family history includes Coronary artery disease in his  "mother; Liver disease in his father.    SOCIAL HISTORY      reports that he has quit smoking. He has never used smokeless tobacco. He reports that he does not drink alcohol or use drugs.    PHYSICAL EXAM     INITIAL VITALS:  height is 180.3 cm (71\") and weight is 77.6 kg (171 lb). His temporal artery  temperature is 98.7 °F (37.1 °C). His blood pressure is 141/71 and his pulse is 83. His respiration is 12 and oxygen saturation is 96%.    Physical Exam    CONSTITUTIONAL: Well developed, well nourished, not diaphoretic nor distressed      CARDIOVASCULAR: Normal rate and rhythm, heart sounds normal, no rub no gallop, intact distal pulses, normal cap refill  PULMONARY: Normal effort and breath sounds, no distress, no wheezes, rhonchi or rales, no chest tenderness  SKIN: Warm, dry, no erythema, no rash, normal color  PSYCH: Mood and affect normal, behavior normal, thought content and judgement normal.      DIFFERENTIAL DIAGNOSIS:       DIAGNOSTIC RESULTS     EKG: All EKG's are interpreted by the Emergency Department Physician who either signs or Co-signs this chart in the absence of a cardiologist.      RADIOLOGY: non-plain film images(s) such as CT, Ultrasound and MRI are read by the radiologist.  Plain radiographic images are visualized and preliminarily interpreted by the emergency physician unless otherwise stated below.           LABS:   Lab Results (last 24 hours)     ** No results found for the last 24 hours. **          EMERGENCY DEPARTMENT COURSE:   Vitals:    Vitals:    04/16/18 1701   BP: 141/71   BP Location: Right arm   Patient Position: Sitting   Pulse: 83   Resp: 12   Temp: 98.7 °F (37.1 °C)   TempSrc: Temporal Artery    SpO2: 96%   Weight: 77.6 kg (171 lb)   Height: 180.3 cm (71\")       The patient was given the following medications:  Medications - No data to display    ED Course       CRITICAL CARE:  none    CONSULTS:  none    PROCEDURES:  None    FINAL IMPRESSION      1. Non-intractable vomiting with " nausea, unspecified vomiting type          DISPOSITION/PLAN   Patient discharged home in stable condition      PATIENT REFERRED TO:  Robby Renee MD  [address] [place] [id number]  [phone]    In 2 days        DISCHARGE MEDICATIONS:     Medication List      CONTINUE taking these medications    amLODIPine 5 MG tablet  Commonly known as:  NORVASC  TAKE 1 TABLET BY MOUTH DAILY     aspirin 81 MG chewable tablet     busPIRone 5 MG tablet  Commonly known as:  BUSPAR     fish oil 1000 MG capsule capsule     metoprolol tartrate 25 MG tablet  Commonly known as:  LOPRESSOR  Take 1 tablet by mouth 2 (Two) Times a Day.     simvastatin 80 MG tablet  Commonly known as:  ZOCOR     VENTOLIN  (90 Base) MCG/ACT inhaler  Generic drug:  albuterol          (Please note that portions of this note were completed with a voice recognition program.)    Grace Montgomery, DO Grace Montgomery DO  04/16/18 1931

## 2018-04-17 NOTE — PROGRESS NOTES
Dual Chamber Pacemaker Evaluation Report  IN OFFICE INTERROGATION    April 16, 2018    Primary Cardiologist: Jessa  : Guidant Model: [employer] MRI   Implant date: 07/26/2017    Reason for evaluation: provider requested.  Requested by GABRIEL Ambrose.  Patient discharged from outside hospital and told to have pacemaker checked immediately.  Indication for pacemaker: bradycardia and mobitz type 2 second degree heart block    Measurements  Atrial sensing - P wave: 6.6 mV  Atrial threshold: 1V@ 0.4ms  Atrial lead impedance: 649 ohms  Ventricular sensing - R wave: 18.7 mV  Ventricular threshold: 0.6 V @ 0.4 ms  Ventricular lead impedance:   758 ohms     Diagnostic Data (03/28/2018-04/16/2018)  Atrial paced: 91 %  Ventricular paced: 100 %    Episodes since 03/28/2018  4 PMT episodes.  All appear to be sinus tachycardia at max tracking rate.  A-pacing percentage has increased since previous interrogation.  No other issues noted.  Discussed with GABRIEL Ambrose, in clinic prior to her visit with patient.  Report discussed with Elisabeth Rose RN, [employer], who also states that device is functioning normally.    Battery status: satisfactory   Estimated 14 years remaining      Final Parameters  Mode:  DDDR  Lower rate: 60 bpm   Upper rate: 120 bpm  AV Delay: paced- 120-180 ms  Srhpec-561-276 ms  Atrial - Amplitude: 2 V   Pulse width: 0.4 ms   Sensitivity: 0.25 mV     Ventricular - Amplitude: 1.1 V  Pulse width: 0.4 ms  Sensitivity: 0.6 mV    Changes made: Normal [place] atrial output changed to 2V based on threshold testing.  Conclusions: normal pacemaker function, stable pacing and sensing thresholds and adequate battery reserve    Follow up: 6 months or per MD request

## 2018-05-15 ENCOUNTER — OFFICE VISIT (OUTPATIENT)
Dept: CARDIOLOGY | Facility: CLINIC | Age: 83
End: 2018-05-15

## 2018-05-15 VITALS
DIASTOLIC BLOOD PRESSURE: 70 MMHG | HEART RATE: 60 BPM | WEIGHT: 180 LBS | SYSTOLIC BLOOD PRESSURE: 130 MMHG | BODY MASS INDEX: 25.2 KG/M2 | HEIGHT: 71 IN

## 2018-05-15 DIAGNOSIS — I10 ESSENTIAL HYPERTENSION: ICD-10-CM

## 2018-05-15 DIAGNOSIS — E78.2 MIXED HYPERLIPIDEMIA: ICD-10-CM

## 2018-05-15 DIAGNOSIS — I25.10 CORONARY ARTERY DISEASE INVOLVING NATIVE CORONARY ARTERY OF NATIVE HEART WITHOUT ANGINA PECTORIS: ICD-10-CM

## 2018-05-15 DIAGNOSIS — I44.1 MOBITZ TYPE 2 SECOND DEGREE HEART BLOCK: Primary | ICD-10-CM

## 2018-05-15 DIAGNOSIS — I47.1 PAT (PAROXYSMAL ATRIAL TACHYCARDIA) (HCC): ICD-10-CM

## 2018-05-15 PROBLEM — I47.19 PAT (PAROXYSMAL ATRIAL TACHYCARDIA): Status: ACTIVE | Noted: 2018-05-15

## 2018-05-15 PROCEDURE — 99213 OFFICE O/P EST LOW 20 MIN: CPT | Performed by: INTERNAL MEDICINE

## 2018-05-15 PROCEDURE — 93000 ELECTROCARDIOGRAM COMPLETE: CPT | Performed by: INTERNAL MEDICINE

## 2018-05-15 NOTE — PROGRESS NOTES
Subjective    Dyllan Greene is a 82 y.o. male. - FU of rhythm , ihd and risks    History of Present Illness     IHD:  Is active and recently danced with his granddaughter at her wedding. Is limited by hip pain but has no exertional cp or unusual sob. EKG today is a and v paced. Has occasional non-exertional cp that resolves spontaneously and quicly. Is compliant with meds.    HTN:  Had light-headedness at last appt and US of carotids ordered but could not be done on short notice. Since has seen pcp and had med adjustment of some sort and the dizziness has resolved. His family member states that he was started on Buspar and that has helped a lot.    HLD:  Is on potent statin that is well tolerated and gets good checks     AVB:  Has DDD pacer that is checking ok. Had some short pat before and was started on low-dose Metoprolol and recent check shows that has resolved. No palpitations now.        The following portions of the patient's history were reviewed and updated as appropriate: allergies, current medications, past family history, past medical history, past social history, past surgical history and problem list.    Patient Active Problem List   Diagnosis   • Symptomatic bradycardia   • S/P cardiac pacemaker procedure   • Mobitz type 2 second degree heart block   • Coronary artery disease involving native coronary artery of native heart without angina pectoris   • Essential hypertension   • Mixed hyperlipidemia   • S/P CABG x 4   • Dizziness   • NSVT (nonsustained ventricular tachycardia)   • Near syncope   • PAT (paroxysmal atrial tachycardia)       No Known Allergies    Family History   Problem Relation Age of Onset   • Coronary artery disease Mother    • Liver disease Father        Social History     Social History   • Marital status:      Spouse name: N/A   • Number of children: N/A   • Years of education: N/A     Occupational History   • Not on file.     Social History Main Topics   • Smoking status:  "Former Smoker   • Smokeless tobacco: Never Used      Comment: quit 1984   • Alcohol use No   • Drug use: No   • Sexual activity: Defer     Other Topics Concern   • Not on file     Social History Narrative   • No narrative on file         Current Outpatient Prescriptions:   •  albuterol (VENTOLIN HFA) 108 (90 BASE) MCG/ACT inhaler, Inhale 2 puffs Every 4 (Four) Hours As Needed for Wheezing., Disp: , Rfl:   •  amLODIPine (NORVASC) 5 MG tablet, TAKE 1 TABLET BY MOUTH DAILY, Disp: 30 tablet, Rfl: 0  •  aspirin 81 MG chewable tablet, Chew 81 mg Daily., Disp: , Rfl:   •  metoprolol tartrate (LOPRESSOR) 25 MG tablet, Take 1 tablet by mouth 2 (Two) Times a Day., Disp: 60 tablet, Rfl: 0  •  Omega-3 Fatty Acids (FISH OIL) 1000 MG capsule capsule, Take  by mouth Daily With Breakfast., Disp: , Rfl:   •  simvastatin (ZOCOR) 80 MG tablet, Take 80 mg by mouth Every Night., Disp: , Rfl:     Past Surgical History:   Procedure Laterality Date   • CARDIAC ELECTROPHYSIOLOGY PROCEDURE N/A 7/26/2017    Procedure: Pacemaker SC new;  Surgeon: Andrew Germain MD;  Location: [facility] CATH INVASIVE LOCATION;  Service:    • CHOLECYSTECTOMY     • CORONARY ARTERY BYPASS GRAFT     • HEMORRHOIDECTOMY     • HIP SURGERY     • JOINT REPLACEMENT         Review of Systems   Constitutional: Negative for fatigue, fever and unexpected weight change.   Respiratory: Negative for apnea, chest tightness and shortness of breath.    Cardiovascular: Negative for chest pain, palpitations and leg swelling.   Gastrointestinal: Negative for abdominal pain.   Genitourinary: Negative for dysuria.   Musculoskeletal: Positive for arthralgias. Negative for myalgias.   Neurological: Negative for weakness and light-headedness.   Psychiatric/Behavioral: Negative for sleep disturbance.       /70   Pulse 60   Ht 180.3 cm (71\")   Wt 81.6 kg (180 lb)   BMI 25.10 kg/m²   Procedures    Objective   Physical Exam   Constitutional: He is oriented to person, place, " and time. He appears well-developed and well-nourished.   HENT:   Head: Normocephalic.   Eyes: Pupils are equal, round, and reactive to light.   Neck: No thyromegaly present.   Cardiovascular: Normal rate, regular rhythm, normal heart sounds and intact distal pulses.  Exam reveals no gallop and no friction rub.    No murmur heard.  Pulmonary/Chest: Effort normal and breath sounds normal. No respiratory distress. He has no wheezes. He has no rales.   Abdominal: Soft. Bowel sounds are normal. He exhibits no distension and no mass. There is no tenderness. There is no guarding.   Musculoskeletal: He exhibits no edema or tenderness.   Neurological: He is alert and oriented to person, place, and time.   Skin: Skin is warm and dry.   Psychiatric: He has a normal mood and affect.       Assessment/Plan   Dyllan was seen today for coronary artery disease, hypertension and hyperlipidemia.    Diagnoses and all orders for this visit:    Mobitz type 2 second degree heart block  Comments:  CONTROLLED WITH PERM PACER    Coronary artery disease involving native coronary artery of native heart without angina pectoris  Comments:  NO ANGINA NOW  Orders:  -     ECG 12 Lead    Essential hypertension  Comments:  CONTROLLED    Mixed hyperlipidemia  Comments:  CONTROLLED WITH HIGH-DOSE POTENT STATIN    PAT (paroxysmal atrial tachycardia)  Comments:  CONTROLLED WITH PACER AND BETA-BLOCKER                 Return in about 6 months (around 11/15/2018), or apc.  Orders Placed This Encounter   Procedures   • ECG 12 Lead     Order Specific Question:   Reason for Exam:     Answer:   cad

## 2018-07-18 ENCOUNTER — CLINICAL SUPPORT (OUTPATIENT)
Dept: CARDIOLOGY | Facility: CLINIC | Age: 83
End: 2018-07-18

## 2018-07-18 DIAGNOSIS — R00.1 SYMPTOMATIC BRADYCARDIA: ICD-10-CM

## 2018-07-18 PROCEDURE — 93294 REM INTERROG EVL PM/LDLS PM: CPT | Performed by: PHYSICIAN ASSISTANT

## 2018-07-18 PROCEDURE — 93296 REM INTERROG EVL PM/IDS: CPT | Performed by: PHYSICIAN ASSISTANT

## 2018-07-28 NOTE — PROGRESS NOTES
Dual Chamber Pacemaker Evaluation Report  [place]    July 28, 2018    Primary Cardiologist: Jessa  : Guidant Model: [employer] MRI  Implant date: 7/26/17    Reason for evaluation: routine  Indication for pacemaker: bradycardia and mobitz type 2 second degree heart block    Measurements  Atrial sensing - P wave: n/r  Atrial threshold: 0.8 V@ 0.4 ms  Atrial lead impedance: 700 ohms  Ventricular sensing - R wave: n/r  Ventricular threshold: 0.8 V @ 0.4 ms  Ventricular lead impedance:   765 ohms     Diagnostic Data  Atrial paced: 94 %  Ventricular paced: 100 %  Other: no new events noted  Battery status: satisfactory         Final Parameters  Mode:  DDDR  Lower rate: 60 bpm   Upper rate: 120 bpm  AV Delay: paced- 120-180 ms  Nevnmx-656-527 ms  Atrial - Amplitude: 2.0 V   Pulse width: 0.4 ms   Sensitivity: 0.25 mV     Ventricular - Amplitude: 1.3 V  Pulse width: 0.4 ms  Sensitivity: 0.6 mV    Changes made: n/a  Conclusions: normal pacemaker function    Follow up: 3 months

## 2018-12-06 ENCOUNTER — OFFICE VISIT (OUTPATIENT)
Dept: CARDIOLOGY | Facility: CLINIC | Age: 83
End: 2018-12-06

## 2018-12-06 VITALS
OXYGEN SATURATION: 97 % | HEIGHT: 71 IN | BODY MASS INDEX: 24.5 KG/M2 | HEART RATE: 60 BPM | WEIGHT: 175 LBS | SYSTOLIC BLOOD PRESSURE: 128 MMHG | RESPIRATION RATE: 18 BRPM | DIASTOLIC BLOOD PRESSURE: 75 MMHG

## 2018-12-06 DIAGNOSIS — Z95.1 S/P CABG X 4: ICD-10-CM

## 2018-12-06 DIAGNOSIS — I47.29 NSVT (NONSUSTAINED VENTRICULAR TACHYCARDIA) (HCC): ICD-10-CM

## 2018-12-06 DIAGNOSIS — I44.1 MOBITZ TYPE 2 SECOND DEGREE HEART BLOCK: ICD-10-CM

## 2018-12-06 DIAGNOSIS — E78.2 MIXED HYPERLIPIDEMIA: ICD-10-CM

## 2018-12-06 DIAGNOSIS — I10 ESSENTIAL HYPERTENSION: ICD-10-CM

## 2018-12-06 DIAGNOSIS — I25.10 CORONARY ARTERY DISEASE INVOLVING NATIVE CORONARY ARTERY OF NATIVE HEART WITHOUT ANGINA PECTORIS: Primary | ICD-10-CM

## 2018-12-06 DIAGNOSIS — R00.1 SYMPTOMATIC BRADYCARDIA: ICD-10-CM

## 2018-12-06 DIAGNOSIS — Z95.0 S/P CARDIAC PACEMAKER PROCEDURE: ICD-10-CM

## 2018-12-06 PROCEDURE — 99214 OFFICE O/P EST MOD 30 MIN: CPT | Performed by: NURSE PRACTITIONER

## 2018-12-06 PROCEDURE — 93000 ELECTROCARDIOGRAM COMPLETE: CPT | Performed by: NURSE PRACTITIONER

## 2018-12-06 NOTE — PROGRESS NOTES
Subjective:     Encounter Date:12/06/2018      Patient ID: Dyllan Greene is a 83 y.o. male. He presents today for routine follow up. 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 He reports an episode of dizziness and diaphoresis in October for which he was evaluated in the ED in [place], [place]. He reports having a negative workup and being discharged home. Remote pacemaker interrogation from that time reviewed, revealing no arrhythmias. He denies any similar episodes since then. He denies chest pain, shortness of breath, palpitations, dizziness, syncope, orthopnea, PND, swelling or decreased stamina. He states that overall he has felt well since his last visit.     Chief Complaint:routine follow up  Coronary Artery Disease   Presents for follow-up visit. Pertinent negatives include no chest pain, chest pressure, chest tightness, dizziness, leg swelling, muscle weakness, palpitations, shortness of breath or weight gain. Risk factors include hyperlipidemia. The symptoms have been stable. Compliance with diet is variable. Compliance with exercise is variable. Compliance with medications is good.   Hypertension   This is a chronic problem. The current episode started more than 1 year ago. The problem is controlled. Associated symptoms include sweats. Pertinent negatives include no anxiety, blurred vision, chest pain, headaches, malaise/fatigue, neck pain, orthopnea, palpitations, peripheral edema, PND or shortness of breath. Risk factors for coronary artery disease include male gender and dyslipidemia. Current antihypertension treatment includes beta blockers and calcium channel blockers. The current treatment provides significant improvement. Hypertensive end-organ damage includes CAD/MI.   Hyperlipidemia   This is a chronic problem. The current  episode started more than 1 year ago. The problem is controlled. Pertinent negatives include no chest pain or shortness of breath. Current antihyperlipidemic treatment includes statins.       The following portions of the patient's history were reviewed and updated as appropriate: allergies, current medications, past family history, past medical history, past social history, past surgical history and problem list.     No Known Allergies    Current Outpatient Medications:   •  albuterol (VENTOLIN HFA) 108 (90 BASE) MCG/ACT inhaler, Inhale 2 puffs Every 4 (Four) Hours As Needed for Wheezing., Disp: , Rfl:   •  amLODIPine (NORVASC) 5 MG tablet, TAKE 1 TABLET BY MOUTH DAILY, Disp: 30 tablet, Rfl: 0  •  aspirin 81 MG chewable tablet, Chew 81 mg Daily., Disp: , Rfl:   •  metoprolol tartrate (LOPRESSOR) 25 MG tablet, Take 1 tablet by mouth 2 (Two) Times a Day., Disp: 60 tablet, Rfl: 0  •  Omega-3 Fatty Acids (FISH OIL) 1000 MG capsule capsule, Take  by mouth Daily With Breakfast., Disp: , Rfl:   •  simvastatin (ZOCOR) 80 MG tablet, Take 80 mg by mouth Every Night., Disp: , Rfl:   Past Medical History:   Diagnosis Date   • Asthma    • Benign hypertension    • CAD in native artery    • Chest pain     CARDIAC AND NON-CARDIAC   • Chest tightness    • COPD (chronic obstructive pulmonary disease) (CMS/HCC)    • Hyperlipidemia    • Hyperlipidemia    • Hypertension    • Myocardial infarct (CMS/HCC)    • S/P CABG x 4     x3 6/20/2003   • SOB (shortness of breath)      MAY BE AN ANIGNAL EQUIVALENT     Past Surgical History:   Procedure Laterality Date   • CHOLECYSTECTOMY     • CORONARY ARTERY BYPASS GRAFT     • HEMORRHOIDECTOMY     • HIP SURGERY     • JOINT REPLACEMENT       Family History   Problem Relation Age of Onset   • Coronary artery disease Mother    • Liver disease Father      Social History     Socioeconomic History   • Marital status:      Spouse name: Not on file   • Number of children: Not on file   • Years of  education: Not on file   • Highest education level: Not on file   Social Needs   • Financial resource strain: Not on file   • Food insecurity - worry: Not on file   • Food insecurity - inability: Not on file   • Transportation needs - medical: Not on file   • Transportation needs - non-medical: Not on file   Occupational History   • Not on file   Tobacco Use   • Smoking status: Former Smoker   • Smokeless tobacco: Never Used   • Tobacco comment: quit 1984   Substance and Sexual Activity   • Alcohol use: No   • Drug use: No   • Sexual activity: Defer   Other Topics Concern   • Not on file   Social History Narrative   • Not on file         Review of Systems   Constitution: Negative for chills, decreased appetite, diaphoresis, fever, weakness, malaise/fatigue, night sweats, weight gain and weight loss.   HENT: Negative for nosebleeds.    Eyes: Negative for blurred vision and visual disturbance.   Cardiovascular: Negative for chest pain, dyspnea on exertion, leg swelling, near-syncope, orthopnea, palpitations, paroxysmal nocturnal dyspnea and syncope.   Respiratory: Negative for chest tightness, cough, hemoptysis, shortness of breath, snoring and wheezing.    Endocrine: Negative for cold intolerance and heat intolerance.   Hematologic/Lymphatic: Does not bruise/bleed easily.   Skin: Negative for rash.   Musculoskeletal: Negative for back pain, falls, muscle weakness and neck pain.   Gastrointestinal: Negative for abdominal pain, change in bowel habit, constipation, diarrhea, dysphagia, heartburn, nausea and vomiting.   Genitourinary: Negative for hematuria.   Neurological: Negative for dizziness, headaches, light-headedness and tremors.   Psychiatric/Behavioral: Negative for altered mental status.   Allergic/Immunologic: Negative for persistent infections.         ECG 12 Lead  Date/Time: 12/6/2018 12:19 PM  Performed by: Elisha Rapp APRN  Authorized by: Elisha Rapp APRN   Comparison: compared with previous ECG  "from 5/15/2018  Similar to previous ECG  Rhythm: paced  Rate: normal  BPM: 60  Clinical impression: abnormal ECG          /75 (BP Location: Right arm, Patient Position: Sitting, Cuff Size: Adult)   Pulse 60   Resp 18   Ht 180.3 cm (71\")   Wt 79.4 kg (175 lb)   SpO2 97%   BMI 24.41 kg/m²        Objective:     Physical Exam   Constitutional: He is oriented to person, place, and time. Vital signs are normal. He appears well-developed and well-nourished. No distress.   HENT:   Head: Normocephalic and atraumatic.   Right Ear: External ear normal.   Left Ear: External ear normal.   Eyes: Conjunctivae are normal. Pupils are equal, round, and reactive to light. Right eye exhibits no discharge. Left eye exhibits no discharge.   Neck: Normal range of motion. Neck supple. No JVD present. Carotid bruit is not present. No thyromegaly present.   Cardiovascular: Normal rate, regular rhythm, normal heart sounds and intact distal pulses. PMI is not displaced. Exam reveals no gallop, no friction rub and no decreased pulses.   No murmur heard.  Pulses:       Radial pulses are 2+ on the right side, and 2+ on the left side.        Dorsalis pedis pulses are 2+ on the right side, and 2+ on the left side.        Posterior tibial pulses are 2+ on the right side, and 2+ on the left side.   Pulmonary/Chest: Effort normal and breath sounds normal. No respiratory distress. He has no decreased breath sounds. He has no wheezes. He has no rhonchi. He has no rales. He exhibits no tenderness.   Abdominal: Soft. Bowel sounds are normal. He exhibits no distension. There is no tenderness.   Musculoskeletal: Normal range of motion. He exhibits no edema.   Neurological: He is alert and oriented to person, place, and time.   Skin: Skin is warm and dry. No rash noted. He is not diaphoretic. No erythema. No pallor.   Psychiatric: He has a normal mood and affect. His behavior is normal. Judgment and thought content normal.   Vitals " reviewed.      Lab Review:   Lab Results   Component Value Date    WBC 9.17 03/18/2018    HGB 14.8 03/18/2018    HCT 44.2 03/18/2018    MCV 90.0 03/18/2018     03/18/2018     Lab Results   Component Value Date    GLUCOSE 131 (H) 03/18/2018    BUN 13 03/18/2018    CREATININE 0.85 03/18/2018    EGFRIFNONA 87 03/18/2018    BCR 15.3 03/18/2018    K 4.4 03/18/2018    CO2 29.0 03/18/2018    CALCIUM 9.2 03/18/2018    ALBUMIN 4.10 03/18/2018    AST 32 03/18/2018    ALT 36 03/18/2018         Assessment:          Diagnosis Plan   1. Coronary artery disease involving native coronary artery of native heart without angina pectoris  No clinical signs of ischemia.    2. S/P CABG x 4  Continues on aspirin, BB and statin.    3. Essential hypertension  Well controlled.   4. Mixed hyperlipidemia  Managed by PCP. On statin.    5. Mobitz type 2 second degree heart block  Paced.    6. NSVT (nonsustained ventricular tachycardia)  No additional episodes of NSVT since initiation of betablocker 3/14/18.    7. Symptomatic bradycardia  Paced.    8. S/P cardiac pacemaker procedure  Remote pacemaker interrogation 7/28/18- 94% a-paced, 100% v-paced, no events, stable thresholds, adequate battery. Due for check in office 02/19.           Plan:       1. Continue medications as previously prescribed.  2. Report any worsening symptoms.  3. Report any signs of bleeding.  4. Continue heart healthy diet and regular exercise as tolerated.   5. Follow up with PCP for blood pressure and cholesterol management and routine lab work.  6. Follow up with Dr. Germain in six months, or sooner if needed.

## 2019-02-05 ENCOUNTER — CLINICAL SUPPORT NO REQUIREMENTS (OUTPATIENT)
Dept: CARDIOLOGY | Facility: CLINIC | Age: 84
End: 2019-02-05

## 2019-02-05 DIAGNOSIS — Z95.0 PACEMAKER: Primary | ICD-10-CM

## 2019-02-05 DIAGNOSIS — R00.1 SYMPTOMATIC BRADYCARDIA: ICD-10-CM

## 2019-02-05 DIAGNOSIS — I44.1 MOBITZ TYPE 2 SECOND DEGREE HEART BLOCK: ICD-10-CM

## 2019-02-05 PROCEDURE — 93280 PM DEVICE PROGR EVAL DUAL: CPT | Performed by: INTERNAL MEDICINE

## 2019-02-05 NOTE — PROGRESS NOTES
Dual Chamber Pacemaker Evaluation Report  IN OFFICE INTERROGATION    February 5, 2019    Primary Cardiologist: Jessa  : Guidant Model: [employer] MRI EL L131  Implant date: 7/26/2017    Reason for evaluation: routine  Indication for pacemaker: bradycardia and mobitz 2 second degree heart block    Measurements  Atrial sensing - P wave: 7.1 mV  Atrial threshold: 1.2V@ 0.4ms  Atrial lead impedance: 738 ohms  Ventricular sensing - R wave: 11.4 mV  Ventricular threshold: 0.7 V @ 0.4 ms  Ventricular lead impedance:   742 ohms     Diagnostic Data  Atrial paced: 85 %  Ventricular paced: 100 %    Episodes recorded since 7/18/2018:  No episodes recorded.    Battery status: satisfactory, estimated 13.5 years remaining      Final Parameters  Mode:  DDDR  Lower rate: 60 bpm   Upper rate: 120 bpm  AV Delay: paced- 120-180 ms  Otccen-221-470 ms  Atrial - Amplitude: 2.4 V   Pulse width: 0.4 ms   Sensitivity: 0.25 mV     Ventricular - Amplitude: 1.2 V  Pulse width: 0.4 ms  Sensitivity: 0.6 mV    Changes made: Normal [place] atrial output changed to 2.4V based on threshold testing.    Conclusions: normal pacemaker function, stable pacing and sensing thresholds and adequate battery reserve    Follow up: Every 6 months via latitude, annually in office

## 2019-05-08 ENCOUNTER — CLINICAL SUPPORT (OUTPATIENT)
Dept: CARDIOLOGY | Facility: CLINIC | Age: 84
End: 2019-05-08

## 2019-05-08 DIAGNOSIS — Z95.0 PACEMAKER: Primary | ICD-10-CM

## 2019-05-08 DIAGNOSIS — R00.1 SYMPTOMATIC BRADYCARDIA: ICD-10-CM

## 2019-05-08 DIAGNOSIS — I44.1 MOBITZ TYPE 2 SECOND DEGREE HEART BLOCK: ICD-10-CM

## 2019-05-08 PROCEDURE — 93294 REM INTERROG EVL PM/LDLS PM: CPT | Performed by: INTERNAL MEDICINE

## 2019-05-08 PROCEDURE — 93296 REM INTERROG EVL PM/IDS: CPT | Performed by: INTERNAL MEDICINE

## 2019-05-15 NOTE — PROGRESS NOTES
Dual Chamber Pacemaker Evaluation Report  REMOTE/LATITUDE    May 8, 2019    Primary Cardiologist: Jessa  : Guidant Model: [employer] MRI EL L131  Implant date: 7/26/2017    Reason for evaluation: routine  Indication for pacemaker: bradycardia and mobitz II second degree heart block    Measurements  Atrial sensing - P wave: 7.6 mV  Atrial threshold: 0.7V@ 0.4ms  Atrial lead impedance: 750 ohms  Ventricular sensing - R wave: N/R  Ventricular threshold: 0.8 V @ 0.4 ms  Ventricular lead impedance:   705 ohms     Diagnostic Data  Atrial paced: 71 %  Ventricular paced: 100 %    Episodes recorded since 2/5/2019:    PMT noted but appears to be sinus tachycardia at max tracking rate of 120 bpm.      Battery status: satisfactory   Estimated 12.5 years remaining      Final Parameters  Mode:  DDDR  Lower rate: 60 bpm   Upper rate: 120 bpm  AV Delay: paced- 120-180 ms  Fjldbf-972-674 ms  Atrial - Amplitude: 2.4 V   Pulse width: 0.4 ms   Sensitivity: 0.25 mV     Ventricular - Amplitude: 1.5 V  Pulse width: 0.4 ms  Sensitivity: 0.6 mV    Changes made: No changes made.  Conclusions: normal pacemaker function, stable pacing thresholds and adequate battery reserve    Follow up: Every 3 months via latitude, annually in office (2/11/2020)

## 2019-06-18 ENCOUNTER — OFFICE VISIT (OUTPATIENT)
Dept: CARDIOLOGY | Facility: CLINIC | Age: 84
End: 2019-06-18

## 2019-06-18 VITALS
HEIGHT: 71 IN | HEART RATE: 60 BPM | BODY MASS INDEX: 25.9 KG/M2 | DIASTOLIC BLOOD PRESSURE: 69 MMHG | SYSTOLIC BLOOD PRESSURE: 130 MMHG | WEIGHT: 185 LBS

## 2019-06-18 DIAGNOSIS — Z95.0 S/P CARDIAC PACEMAKER PROCEDURE: ICD-10-CM

## 2019-06-18 DIAGNOSIS — I44.1 MOBITZ TYPE 2 SECOND DEGREE HEART BLOCK: ICD-10-CM

## 2019-06-18 DIAGNOSIS — I25.10 CORONARY ARTERY DISEASE INVOLVING NATIVE CORONARY ARTERY OF NATIVE HEART WITHOUT ANGINA PECTORIS: Primary | ICD-10-CM

## 2019-06-18 DIAGNOSIS — E78.2 MIXED HYPERLIPIDEMIA: ICD-10-CM

## 2019-06-18 DIAGNOSIS — I10 ESSENTIAL HYPERTENSION: ICD-10-CM

## 2019-06-18 DIAGNOSIS — I49.5 SICK SINUS SYNDROME (HCC): ICD-10-CM

## 2019-06-18 PROCEDURE — 93000 ELECTROCARDIOGRAM COMPLETE: CPT | Performed by: INTERNAL MEDICINE

## 2019-06-18 PROCEDURE — 99213 OFFICE O/P EST LOW 20 MIN: CPT | Performed by: INTERNAL MEDICINE

## 2019-06-18 RX ORDER — BUSPIRONE HYDROCHLORIDE 5 MG/1
5 TABLET ORAL 2 TIMES DAILY
COMMUNITY

## 2019-06-18 NOTE — PROGRESS NOTES
"Subjective    Dyllan Greene is a 83 y.o. male. Fu of ihd and risks and rhythm    History of Present Illness     SSS + AVB + PACER:  Is active with good stamina and no palpitations. Pacer checks good 5/8/19. Is ap/ 71/100. Has good alonzo reserve. Is on meds ok.     IHD:  Has good stamina for him and no decline over the past year and has not had to use an NTG in over 7 mo. He is pleased with his stamina and \"can do all I want to\"    HTN:  Does not check at home but always gets good clinic check and no light-headedness on current meds    HLD:  Is on a high dose of Zocor with no myalgias and gets good checks with his pcp but does not know his numbers      The following portions of the patient's history were reviewed and updated as appropriate: allergies, current medications, past family history, past medical history, past social history, past surgical history and problem list.    Patient Active Problem List   Diagnosis   • Symptomatic bradycardia   • S/P cardiac pacemaker procedure   • Mobitz type 2 second degree heart block   • Coronary artery disease involving native coronary artery of native heart without angina pectoris   • Essential hypertension   • Mixed hyperlipidemia   • S/P CABG x 4   • Dizziness   • NSVT (nonsustained ventricular tachycardia) (CMS/HCC)   • Near syncope   • PAT (paroxysmal atrial tachycardia) (CMS/HCC)   • Sick sinus syndrome (CMS/HCC)       No Known Allergies    Family History   Problem Relation Age of Onset   • Coronary artery disease Mother    • Liver disease Father        Social History     Socioeconomic History   • Marital status:      Spouse name: Not on file   • Number of children: Not on file   • Years of education: Not on file   • Highest education level: Not on file   Tobacco Use   • Smoking status: Former Smoker   • Smokeless tobacco: Never Used   • Tobacco comment: quit 1984   Substance and Sexual Activity   • Alcohol use: No   • Drug use: No   • Sexual activity: Defer " "        Current Outpatient Medications:   •  albuterol (VENTOLIN HFA) 108 (90 BASE) MCG/ACT inhaler, Inhale 2 puffs Every 4 (Four) Hours As Needed for Wheezing., Disp: , Rfl:   •  amLODIPine (NORVASC) 5 MG tablet, TAKE 1 TABLET BY MOUTH DAILY, Disp: 30 tablet, Rfl: 0  •  aspirin 81 MG chewable tablet, Chew 81 mg Daily., Disp: , Rfl:   •  busPIRone (BUSPAR) 5 MG tablet, Take 5 mg by mouth 3 (Three) Times a Day., Disp: , Rfl:   •  metoprolol tartrate (LOPRESSOR) 25 MG tablet, Take 1 tablet by mouth 2 (Two) Times a Day., Disp: 60 tablet, Rfl: 0  •  Omega-3 Fatty Acids (FISH OIL) 1000 MG capsule capsule, Take  by mouth Daily With Breakfast., Disp: , Rfl:   •  simvastatin (ZOCOR) 80 MG tablet, Take 80 mg by mouth Every Night., Disp: , Rfl:     Past Surgical History:   Procedure Laterality Date   • CARDIAC ELECTROPHYSIOLOGY PROCEDURE N/A 7/26/2017    Procedure: Pacemaker SC new;  Surgeon: Andrew Germain MD;  Location:  [facility];  Service:    • CHOLECYSTECTOMY     • CORONARY ARTERY BYPASS GRAFT     • HEMORRHOIDECTOMY     • HIP SURGERY     • JOINT REPLACEMENT         Review of Systems   Constitutional: Negative for fatigue, fever and unexpected weight change.   Respiratory: Negative for apnea, chest tightness and shortness of breath.    Cardiovascular: Negative for chest pain, palpitations and leg swelling.   Gastrointestinal: Negative for abdominal pain and blood in stool.   Genitourinary: Negative for dysuria and hematuria.   Musculoskeletal: Positive for back pain. Negative for myalgias.   Neurological: Negative for weakness and light-headedness.   Psychiatric/Behavioral: Negative for sleep disturbance.       /69   Pulse 60   Ht 180.3 cm (71\")   Wt 83.9 kg (185 lb)   BMI 25.80 kg/m²   Procedures    Objective   Physical Exam   Constitutional: He appears well-developed and well-nourished. No distress.   HENT:   Head: Normocephalic.   Eyes: Pupils are equal, round, and reactive to " light.   Neck: No thyromegaly present.   Cardiovascular: Normal rate, regular rhythm, normal heart sounds and intact distal pulses. Exam reveals no gallop and no friction rub.   No murmur heard.  Pulmonary/Chest: Effort normal and breath sounds normal. No stridor. No respiratory distress. He has no wheezes. He has no rales.   Abdominal: Soft. Bowel sounds are normal. He exhibits no distension and no mass. There is no tenderness. There is no guarding.   Musculoskeletal: He exhibits edema. He exhibits no tenderness or deformity.   1+ bilat ankle edema and tr pre-tib edema   Skin: Skin is warm and dry. He is not diaphoretic.   Psychiatric: He has a normal mood and affect.       Assessment/Plan   Dyllan was seen today for coronary artery disease, hypertension and hyperlipidemia.    Diagnoses and all orders for this visit:    Coronary artery disease involving native coronary artery of native heart without angina pectoris  Comments:  stable - cpt  Orders:  -     ECG 12 Lead    Essential hypertension  Comments:  controlled    Mixed hyperlipidemia  Comments:  contolled    Mobitz type 2 second degree heart block    Sick sinus syndrome (CMS/HCC)  Comments:  asymptomatic with meds + pacer    S/P cardiac pacemaker procedure  Comments:  checks ok                 Return in about 1 year (around 6/18/2020) for Next scheduled follow up with apc.  No orders of the defined types were placed in this encounter.

## 2019-08-07 ENCOUNTER — CLINICAL SUPPORT (OUTPATIENT)
Dept: CARDIOLOGY | Facility: CLINIC | Age: 84
End: 2019-08-07

## 2019-08-07 DIAGNOSIS — I44.1 MOBITZ TYPE 2 SECOND DEGREE HEART BLOCK: ICD-10-CM

## 2019-08-07 PROCEDURE — 93296 REM INTERROG EVL PM/IDS: CPT | Performed by: PHYSICIAN ASSISTANT

## 2019-08-07 PROCEDURE — 93294 REM INTERROG EVL PM/LDLS PM: CPT | Performed by: PHYSICIAN ASSISTANT

## 2019-08-25 NOTE — PROGRESS NOTES
Dual Chamber Pacemaker Evaluation Report  [facility]    August 24, 2019    Primary Cardiologist: Jessa  : Guidant Model: [employer] MRI  Implant date: 7/26/17    Reason for evaluation: routine  Indication for pacemaker: second degree AV block and bradycardia    Measurements  Atrial sensing - P wave: 7.4 mV  Atrial threshold: 0.7 V@ 0.4 ms  Atrial lead impedance: 741 ohms  Ventricular sensing - R wave: n/r  Ventricular threshold: 0.9 V @ 0.4 ms  Ventricular lead impedance:   700 ohms     Diagnostic Data  Atrial paced: 66 %  Ventricular paced: 100 %  Other: PMT alerts noted but are ST at max tracking rate.  Battery status: satisfactory         Final Parameters  Mode:  DDDR  Lower rate: 60 bpm   Upper rate: 120 bpm  AV Delay: paced- 120-180 ms  Tvxtbh-886-300 ms  Atrial - Amplitude: 2.4 V   Pulse width: 0.4 ms   Sensitivity: 0.25 mV     Ventricular - Amplitude: 1.4 V  Pulse width: 0.4 ms  Sensitivity: 0.6 mV    Changes made: n/r  Conclusions: normal pacemaker function    Follow up: 3 months

## 2019-11-06 ENCOUNTER — CLINICAL SUPPORT (OUTPATIENT)
Dept: CARDIOLOGY | Facility: CLINIC | Age: 84
End: 2019-11-06

## 2019-11-06 DIAGNOSIS — I44.1 MOBITZ TYPE 2 SECOND DEGREE HEART BLOCK: ICD-10-CM

## 2019-11-06 PROCEDURE — 93294 REM INTERROG EVL PM/LDLS PM: CPT | Performed by: PHYSICIAN ASSISTANT

## 2019-11-06 PROCEDURE — 93296 REM INTERROG EVL PM/IDS: CPT | Performed by: PHYSICIAN ASSISTANT

## 2019-12-05 NOTE — PROGRESS NOTES
Dual Chamber Pacemaker Evaluation Report  [facility]    December 5, 2019    Primary Cardiologist: Jessa  : Guidant Model: [employer] MRI  Implant date: 7/26/17    Reason for evaluation: routine  Indication for pacemaker: second degree AV block    Measurements  Atrial sensing - P wave: 8.4 mV  Atrial threshold: 0.7 V @ 0.4 ms  Atrial lead impedance: 731 ohms  Ventricular sensing - R wave: n/r  Ventricular threshold: 0.7 V @ 0.4 ms  Ventricular lead impedance:   766 ohms     Diagnostic Data  Atrial paced: 57 %  Ventricular paced: 100 %  Other: PMT alert noted.  Strips reveal ST at maximum tracking rate.  Battery status: satisfactory         Final Parameters  Mode:  DDDR  Lower rate: 60 bpm   Upper rate: 120 bpm  AV Delay: paced- 120-180 ms  Ivogze-873-426 ms  Atrial - Amplitude: 2.4 V   Pulse width: 0.4 ms   Sensitivity: 0.25 mV     Ventricular - Amplitude: 1.2 V  Pulse width: 0.4 ms  Sensitivity: 0.6 mV    Changes made: n/a  Conclusions: normal pacemaker function    Follow up: 3 months

## 2020-02-06 ENCOUNTER — HOSPITAL ENCOUNTER (OUTPATIENT)
Facility: HOSPITAL | Age: 85
Setting detail: OBSERVATION
Discharge: HOME | End: 2020-02-06
Attending: INTERNAL MEDICINE | Admitting: INTERNAL MEDICINE

## 2020-02-06 ENCOUNTER — APPOINTMENT (OUTPATIENT)
Dept: CARDIOLOGY | Facility: HOSPITAL | Age: 85
End: 2020-02-06

## 2020-02-06 ENCOUNTER — APPOINTMENT (OUTPATIENT)
Dept: GENERAL RADIOLOGY | Facility: HOSPITAL | Age: 85
End: 2020-02-06

## 2020-02-06 VITALS
RESPIRATION RATE: 18 BRPM | DIASTOLIC BLOOD PRESSURE: 77 MMHG | BODY MASS INDEX: 25.37 KG/M2 | TEMPERATURE: 97.6 F | WEIGHT: 181.22 LBS | OXYGEN SATURATION: 99 % | HEIGHT: 71 IN | HEART RATE: 68 BPM | SYSTOLIC BLOOD PRESSURE: 166 MMHG

## 2020-02-06 DIAGNOSIS — R07.9 CHEST PAIN, UNSPECIFIED TYPE: Primary | ICD-10-CM

## 2020-02-06 PROBLEM — Z95.5 PRESENCE OF DRUG COATED STENT IN LAD CORONARY ARTERY: Status: ACTIVE | Noted: 2020-02-06

## 2020-02-06 PROBLEM — I25.110 CORONARY ARTERY DISEASE INVOLVING NATIVE CORONARY ARTERY OF NATIVE HEART WITH UNSTABLE ANGINA PECTORIS (HCC): Status: ACTIVE | Noted: 2017-07-27

## 2020-02-06 LAB
ALBUMIN SERPL-MCNC: 4 G/DL (ref 3.5–5.2)
ALBUMIN/GLOB SERPL: 1.4 G/DL
ALP SERPL-CCNC: 92 U/L (ref 39–117)
ALT SERPL W P-5'-P-CCNC: 27 U/L (ref 1–41)
ANION GAP SERPL CALCULATED.3IONS-SCNC: 9 MMOL/L (ref 5–15)
AST SERPL-CCNC: 26 U/L (ref 1–40)
BASOPHILS # BLD AUTO: 0.09 10*3/MM3 (ref 0–0.2)
BASOPHILS NFR BLD AUTO: 1 % (ref 0–1.5)
BH CV STRESS BP STAGE 1: NORMAL
BH CV STRESS BP STAGE 2: NORMAL
BH CV STRESS BP STAGE 3: NORMAL
BH CV STRESS DOB - ATROPINE STAGE 3: 0.3
BH CV STRESS DOSE DOBUTAMINE STAGE 1: 10
BH CV STRESS DOSE DOBUTAMINE STAGE 2: 20
BH CV STRESS DOSE DOBUTAMINE STAGE 3: 30
BH CV STRESS DURATION MIN STAGE 1: 3
BH CV STRESS DURATION MIN STAGE 2: 3
BH CV STRESS DURATION MIN STAGE 3: 2
BH CV STRESS DURATION SEC STAGE 1: 0
BH CV STRESS DURATION SEC STAGE 2: 0
BH CV STRESS DURATION SEC STAGE 3: 0
BH CV STRESS HR STAGE 1: 67
BH CV STRESS HR STAGE 2: 74
BH CV STRESS HR STAGE 3: 133
BH CV STRESS PROTOCOL 1: NORMAL
BH CV STRESS RECOVERY BP: NORMAL MMHG
BH CV STRESS RECOVERY HR: 88 BPM
BH CV STRESS STAGE 1: 1
BH CV STRESS STAGE 2: 2
BH CV STRESS STAGE 3: 3
BILIRUB SERPL-MCNC: 0.6 MG/DL (ref 0.2–1.2)
BUN BLD-MCNC: 10 MG/DL (ref 8–23)
BUN/CREAT SERPL: 13.3 (ref 7–25)
CALCIUM SPEC-SCNC: 9 MG/DL (ref 8.6–10.5)
CHLORIDE SERPL-SCNC: 107 MMOL/L (ref 98–107)
CHOLEST SERPL-MCNC: 120 MG/DL (ref 0–200)
CO2 SERPL-SCNC: 27 MMOL/L (ref 22–29)
CREAT BLD-MCNC: 0.75 MG/DL (ref 0.76–1.27)
D DIMER PPP FEU-MCNC: 0.7 MG/L (FEU) (ref 0–0.5)
DEPRECATED RDW RBC AUTO: 45.1 FL (ref 37–54)
EOSINOPHIL # BLD AUTO: 0.42 10*3/MM3 (ref 0–0.4)
EOSINOPHIL NFR BLD AUTO: 4.5 % (ref 0.3–6.2)
ERYTHROCYTE [DISTWIDTH] IN BLOOD BY AUTOMATED COUNT: 13.8 % (ref 12.3–15.4)
GFR SERPL CREATININE-BSD FRML MDRD: 99 ML/MIN/1.73
GLOBULIN UR ELPH-MCNC: 2.9 GM/DL
GLUCOSE BLD-MCNC: 112 MG/DL (ref 65–99)
HBA1C MFR BLD: 6.1 % (ref 4.8–5.6)
HCT VFR BLD AUTO: 42.7 % (ref 37.5–51)
HDLC SERPL-MCNC: 48 MG/DL (ref 40–60)
HGB BLD-MCNC: 14.6 G/DL (ref 13–17.7)
HOLD SPECIMEN: NORMAL
HOLD SPECIMEN: NORMAL
IMM GRANULOCYTES # BLD AUTO: 0.02 10*3/MM3 (ref 0–0.05)
IMM GRANULOCYTES NFR BLD AUTO: 0.2 % (ref 0–0.5)
LDLC SERPL CALC-MCNC: 56 MG/DL (ref 0–100)
LDLC/HDLC SERPL: 1.17 {RATIO}
LIPASE SERPL-CCNC: 17 U/L (ref 13–60)
LYMPHOCYTES # BLD AUTO: 2.57 10*3/MM3 (ref 0.7–3.1)
LYMPHOCYTES NFR BLD AUTO: 27.3 % (ref 19.6–45.3)
MCH RBC QN AUTO: 30.5 PG (ref 26.6–33)
MCHC RBC AUTO-ENTMCNC: 34.2 G/DL (ref 31.5–35.7)
MCV RBC AUTO: 89.1 FL (ref 79–97)
MONOCYTES # BLD AUTO: 0.91 10*3/MM3 (ref 0.1–0.9)
MONOCYTES NFR BLD AUTO: 9.7 % (ref 5–12)
NEUTROPHILS # BLD AUTO: 5.42 10*3/MM3 (ref 1.7–7)
NEUTROPHILS NFR BLD AUTO: 57.3 % (ref 42.7–76)
NRBC BLD AUTO-RTO: 0 /100 WBC (ref 0–0.2)
NT-PROBNP SERPL-MCNC: 306.4 PG/ML (ref 5–1800)
PERCENT MAX PREDICTED HR: 55.47 %
PLATELET # BLD AUTO: 193 10*3/MM3 (ref 140–450)
PMV BLD AUTO: 12.5 FL (ref 6–12)
POTASSIUM BLD-SCNC: 3.8 MMOL/L (ref 3.5–5.2)
PROT SERPL-MCNC: 6.9 G/DL (ref 6–8.5)
RBC # BLD AUTO: 4.79 10*6/MM3 (ref 4.14–5.8)
SODIUM BLD-SCNC: 143 MMOL/L (ref 136–145)
STRESS BASELINE BP: NORMAL MMHG
STRESS BASELINE HR: 64 BPM
STRESS PERCENT HR: 65 %
STRESS POST EXERCISE DUR MIN: 9 MIN
STRESS POST EXERCISE DUR SEC: 48 SEC
STRESS POST PEAK BP: NORMAL MMHG
STRESS POST PEAK HR: 76 BPM
TRIGL SERPL-MCNC: 80 MG/DL (ref 0–150)
TROPONIN T SERPL-MCNC: <0.01 NG/ML (ref 0–0.03)
VLDLC SERPL-MCNC: 16 MG/DL
WBC NRBC COR # BLD: 9.43 10*3/MM3 (ref 3.4–10.8)
WHOLE BLOOD HOLD SPECIMEN: NORMAL
WHOLE BLOOD HOLD SPECIMEN: NORMAL

## 2020-02-06 PROCEDURE — 93017 CV STRESS TEST TRACING ONLY: CPT

## 2020-02-06 PROCEDURE — 36415 COLL VENOUS BLD VENIPUNCTURE: CPT

## 2020-02-06 PROCEDURE — 99219 PR INITIAL OBSERVATION CARE/DAY 50 MINUTES: CPT | Performed by: INTERNAL MEDICINE

## 2020-02-06 PROCEDURE — 93350 STRESS TTE ONLY: CPT | Performed by: INTERNAL MEDICINE

## 2020-02-06 PROCEDURE — 25010000002 PERFLUTREN 6.52 MG/ML SUSPENSION: Performed by: INTERNAL MEDICINE

## 2020-02-06 PROCEDURE — 83880 ASSAY OF NATRIURETIC PEPTIDE: CPT | Performed by: INTERNAL MEDICINE

## 2020-02-06 PROCEDURE — 93005 ELECTROCARDIOGRAM TRACING: CPT | Performed by: INTERNAL MEDICINE

## 2020-02-06 PROCEDURE — 85379 FIBRIN DEGRADATION QUANT: CPT | Performed by: INTERNAL MEDICINE

## 2020-02-06 PROCEDURE — G0378 HOSPITAL OBSERVATION PER HR: HCPCS

## 2020-02-06 PROCEDURE — 71045 X-RAY EXAM CHEST 1 VIEW: CPT

## 2020-02-06 PROCEDURE — 96372 THER/PROPH/DIAG INJ SC/IM: CPT

## 2020-02-06 PROCEDURE — 25010000003 DOBUTAMINE PER 250 MG: Performed by: INTERNAL MEDICINE

## 2020-02-06 PROCEDURE — 93010 ELECTROCARDIOGRAM REPORT: CPT | Performed by: INTERNAL MEDICINE

## 2020-02-06 PROCEDURE — 83036 HEMOGLOBIN GLYCOSYLATED A1C: CPT | Performed by: NURSE PRACTITIONER

## 2020-02-06 PROCEDURE — 93352 ADMIN ECG CONTRAST AGENT: CPT | Performed by: INTERNAL MEDICINE

## 2020-02-06 PROCEDURE — 85025 COMPLETE CBC W/AUTO DIFF WBC: CPT | Performed by: INTERNAL MEDICINE

## 2020-02-06 PROCEDURE — 84484 ASSAY OF TROPONIN QUANT: CPT | Performed by: INTERNAL MEDICINE

## 2020-02-06 PROCEDURE — 25010000002 ENOXAPARIN PER 10 MG: Performed by: NURSE PRACTITIONER

## 2020-02-06 PROCEDURE — 93018 CV STRESS TEST I&R ONLY: CPT | Performed by: INTERNAL MEDICINE

## 2020-02-06 PROCEDURE — 99284 EMERGENCY DEPT VISIT MOD MDM: CPT

## 2020-02-06 PROCEDURE — 80053 COMPREHEN METABOLIC PANEL: CPT | Performed by: INTERNAL MEDICINE

## 2020-02-06 PROCEDURE — 80061 LIPID PANEL: CPT | Performed by: NURSE PRACTITIONER

## 2020-02-06 PROCEDURE — 83690 ASSAY OF LIPASE: CPT | Performed by: INTERNAL MEDICINE

## 2020-02-06 PROCEDURE — 93350 STRESS TTE ONLY: CPT

## 2020-02-06 RX ORDER — SODIUM CHLORIDE 0.9 % (FLUSH) 0.9 %
10 SYRINGE (ML) INJECTION AS NEEDED
Status: DISCONTINUED | OUTPATIENT
Start: 2020-02-06 | End: 2020-02-06 | Stop reason: HOSPADM

## 2020-02-06 RX ORDER — ACETAMINOPHEN 650 MG/1
650 SUPPOSITORY RECTAL EVERY 4 HOURS PRN
Status: DISCONTINUED | OUTPATIENT
Start: 2020-02-06 | End: 2020-02-06 | Stop reason: HOSPADM

## 2020-02-06 RX ORDER — BISACODYL 5 MG/1
5 TABLET, DELAYED RELEASE ORAL DAILY PRN
Status: DISCONTINUED | OUTPATIENT
Start: 2020-02-06 | End: 2020-02-06 | Stop reason: HOSPADM

## 2020-02-06 RX ORDER — SODIUM CHLORIDE 0.9 % (FLUSH) 0.9 %
10 SYRINGE (ML) INJECTION EVERY 12 HOURS SCHEDULED
Status: DISCONTINUED | OUTPATIENT
Start: 2020-02-06 | End: 2020-02-06 | Stop reason: HOSPADM

## 2020-02-06 RX ORDER — FUROSEMIDE 20 MG/1
20 TABLET ORAL 2 TIMES DAILY
COMMUNITY
End: 2020-02-26 | Stop reason: SDDI

## 2020-02-06 RX ORDER — ALBUTEROL SULFATE 2.5 MG/3ML
2.5 SOLUTION RESPIRATORY (INHALATION) EVERY 6 HOURS PRN
Status: DISCONTINUED | OUTPATIENT
Start: 2020-02-06 | End: 2020-02-06 | Stop reason: HOSPADM

## 2020-02-06 RX ORDER — LOSARTAN POTASSIUM 25 MG/1
25 TABLET ORAL
Qty: 90 TABLET | Refills: 3 | Status: SHIPPED | OUTPATIENT
Start: 2020-02-06 | End: 2020-11-02

## 2020-02-06 RX ORDER — CALCIUM CARBONATE 200(500)MG
2 TABLET,CHEWABLE ORAL 2 TIMES DAILY PRN
Status: DISCONTINUED | OUTPATIENT
Start: 2020-02-06 | End: 2020-02-06 | Stop reason: HOSPADM

## 2020-02-06 RX ORDER — NITROGLYCERIN 0.4 MG/1
0.4 TABLET SUBLINGUAL
COMMUNITY

## 2020-02-06 RX ORDER — NITROGLYCERIN 0.4 MG/1
0.4 TABLET SUBLINGUAL
Status: DISCONTINUED | OUTPATIENT
Start: 2020-02-06 | End: 2020-02-06 | Stop reason: HOSPADM

## 2020-02-06 RX ORDER — ONDANSETRON 4 MG/1
4 TABLET, FILM COATED ORAL EVERY 6 HOURS PRN
Status: DISCONTINUED | OUTPATIENT
Start: 2020-02-06 | End: 2020-02-06 | Stop reason: HOSPADM

## 2020-02-06 RX ORDER — BUSPIRONE HYDROCHLORIDE 5 MG/1
5 TABLET ORAL 3 TIMES DAILY
Status: DISCONTINUED | OUTPATIENT
Start: 2020-02-06 | End: 2020-02-06 | Stop reason: HOSPADM

## 2020-02-06 RX ORDER — DOBUTAMINE HYDROCHLORIDE 100 MG/100ML
10-50 INJECTION INTRAVENOUS CONTINUOUS
Status: DISCONTINUED | OUTPATIENT
Start: 2020-02-06 | End: 2020-02-06

## 2020-02-06 RX ORDER — ACETAMINOPHEN 325 MG/1
650 TABLET ORAL EVERY 4 HOURS PRN
Status: DISCONTINUED | OUTPATIENT
Start: 2020-02-06 | End: 2020-02-06 | Stop reason: HOSPADM

## 2020-02-06 RX ORDER — AMLODIPINE BESYLATE 5 MG/1
5 TABLET ORAL DAILY
Status: DISCONTINUED | OUTPATIENT
Start: 2020-02-06 | End: 2020-02-06 | Stop reason: HOSPADM

## 2020-02-06 RX ORDER — ASPIRIN 81 MG/1
81 TABLET, CHEWABLE ORAL DAILY
Status: DISCONTINUED | OUTPATIENT
Start: 2020-02-06 | End: 2020-02-06 | Stop reason: HOSPADM

## 2020-02-06 RX ORDER — LOSARTAN POTASSIUM 25 MG/1
25 TABLET ORAL
Status: DISCONTINUED | OUTPATIENT
Start: 2020-02-06 | End: 2020-02-06 | Stop reason: HOSPADM

## 2020-02-06 RX ORDER — ACETAMINOPHEN 160 MG/5ML
650 SOLUTION ORAL EVERY 4 HOURS PRN
Status: DISCONTINUED | OUTPATIENT
Start: 2020-02-06 | End: 2020-02-06 | Stop reason: HOSPADM

## 2020-02-06 RX ORDER — ATORVASTATIN CALCIUM 40 MG/1
40 TABLET, FILM COATED ORAL DAILY
Status: DISCONTINUED | OUTPATIENT
Start: 2020-02-06 | End: 2020-02-06 | Stop reason: HOSPADM

## 2020-02-06 RX ORDER — ONDANSETRON 2 MG/ML
4 INJECTION INTRAMUSCULAR; INTRAVENOUS EVERY 6 HOURS PRN
Status: DISCONTINUED | OUTPATIENT
Start: 2020-02-06 | End: 2020-02-06 | Stop reason: HOSPADM

## 2020-02-06 RX ORDER — FUROSEMIDE 20 MG/1
20 TABLET ORAL 2 TIMES DAILY
Status: DISCONTINUED | OUTPATIENT
Start: 2020-02-06 | End: 2020-02-06 | Stop reason: HOSPADM

## 2020-02-06 RX ADMIN — Medication 10 MCG/KG/MIN: at 12:37

## 2020-02-06 RX ADMIN — SODIUM CHLORIDE, PRESERVATIVE FREE 10 ML: 5 INJECTION INTRAVENOUS at 12:17

## 2020-02-06 RX ADMIN — BUSPIRONE HYDROCHLORIDE 5 MG: 5 TABLET ORAL at 12:15

## 2020-02-06 RX ADMIN — AMLODIPINE BESYLATE 5 MG: 5 TABLET ORAL at 12:15

## 2020-02-06 RX ADMIN — ENOXAPARIN SODIUM 40 MG: 40 INJECTION SUBCUTANEOUS at 12:15

## 2020-02-06 RX ADMIN — ASPIRIN 81 MG 81 MG: 81 TABLET ORAL at 12:15

## 2020-02-06 RX ADMIN — PERFLUTREN 8.48 MG: 6.52 INJECTION, SUSPENSION INTRAVENOUS at 12:37

## 2020-02-06 RX ADMIN — ATROPINE SULFATE 0.3 MG: 0.1 INJECTION PARENTERAL at 13:03

## 2020-02-06 RX ADMIN — FUROSEMIDE 20 MG: 20 TABLET ORAL at 12:15

## 2020-02-06 RX ADMIN — METOPROLOL TARTRATE 25 MG: 25 TABLET, FILM COATED ORAL at 12:15

## 2020-02-06 RX ADMIN — ATORVASTATIN CALCIUM 40 MG: 40 TABLET, FILM COATED ORAL at 12:15

## 2020-02-06 NOTE — H&P
[facility] HEART GROUP HISTORY AND PHYSICAL & DISCHARGE SUMMARY      Patient Care Team:  Robby Renee MD as PCP - General (Pediatrics)    Chief complaint: Chest Pain    Subjective     Dyllan Greene is a 84 y.o. male transferred to [facility] from [facility] in [place], [place] with complaints of chest pain.  He reports onset of sharp, left-sided chest pain yesterday afternoon around 1330 while in the vehicle waiting for his grandchildren to get out of school.  The pain radiated to the left arm and left neck and lasted several minutes.  The pain was not associated with any other symptoms and resolved on its own.  However, he had recurrence of similar pain throughout the remainder of the day, which prompted presentation to the emergency department.  Patient denies shortness of breath, palpitations, dizziness, syncope, orthopnea, PND, edema or decreased stamina.  Patient denies any signs of bleeding.  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  Initial EKG at [facility] revealed normal sinus rhythm without acute ST-T changes.  Troponin level there was normal.  Other labs unremarkable.  Repeat troponin level at [facility] was normal.  BNP normal.  D-dimer elevated at 0.70.  Other labs unremarkable.  Chest x-ray negative for acute cardiopulmonary process.  Stress echo pending.     Review of Systems  Review of Systems   Constitution: Negative for chills, decreased appetite, fever, malaise/fatigue, night sweats, weight gain and weight loss.   HENT: Negative for nosebleeds.    Eyes: Negative for visual disturbance.   Cardiovascular: Positive for chest pain. Negative for dyspnea on exertion, leg swelling, near-syncope, orthopnea, palpitations, paroxysmal nocturnal dyspnea and  syncope.   Respiratory: Negative for cough, hemoptysis, shortness of breath, snoring and wheezing.    Endocrine: Negative for cold intolerance and heat intolerance.   Hematologic/Lymphatic: Does not bruise/bleed easily.   Skin: Negative for rash.   Musculoskeletal: Negative for back pain and falls.   Gastrointestinal: Negative for abdominal pain, change in bowel habit, constipation, diarrhea, dysphagia, heartburn, nausea and vomiting.   Genitourinary: Negative for hematuria.   Neurological: Negative for dizziness, headaches, light-headedness and weakness.   Psychiatric/Behavioral: Negative for altered mental status.   Allergic/Immunologic: Negative for persistent infections.        History  Past Medical History:   Diagnosis Date   • Asthma    • Benign hypertension    • CAD in native artery    • Chest pain     CARDIAC AND NON-CARDIAC   • Chest tightness    • COPD (chronic obstructive pulmonary disease) (CMS/[facility])    • Hyperlipidemia    • Hyperlipidemia    • Hypertension    • Myocardial infarct (CMS/[facility])    • S/P CABG x 4     x3 6/20/2003   • SOB (shortness of breath)      MAY BE AN ANIGNAL EQUIVALENT     Past Surgical History:   Procedure Laterality Date   • CARDIAC ELECTROPHYSIOLOGY PROCEDURE N/A 7/26/2017    Procedure: Pacemaker SC new;  Surgeon: Andrew Germain MD;  Location: [facility] CATH INVASIVE LOCATION;  Service:    • CHOLECYSTECTOMY     • CORONARY ARTERY BYPASS GRAFT     • HEMORRHOIDECTOMY     • HIP SURGERY     • JOINT REPLACEMENT       Family History   Problem Relation Age of Onset   • Coronary artery disease Mother    • Liver disease Father      Social History     Tobacco Use   • Smoking status: Former Smoker   • Smokeless tobacco: Never Used   • Tobacco comment: quit 1984   Substance Use Topics   • Alcohol use: No   • Drug use: No       Medications  Prior to Admission medications    Medication Sig Start Date End Date Taking? Authorizing Provider   albuterol (VENTOLIN HFA) 108 (90 BASE) MCG/ACT  inhaler Inhale 2 puffs Every 4 (Four) Hours As Needed for Wheezing.   Yes Dank Adan MD   amLODIPine (NORVASC) 5 MG tablet TAKE 1 TABLET BY MOUTH DAILY 9/26/16  Yes Andrew Germain MD   aspirin 81 MG chewable tablet Chew 81 mg Daily.   Yes Dank Adan MD   furosemide (LASIX) 20 MG tablet Take 20 mg by mouth 2 (Two) Times a Day.   Yes Dank Adan MD   nitroglycerin (NITROSTAT) 0.4 MG SL tablet Place 0.4 mg under the tongue Every 5 (Five) Minutes As Needed for Chest Pain. Take no more than 3 doses in 15 minutes.   Yes Dank Adan MD   Omega-3 Fatty Acids (FISH OIL) 1000 MG capsule capsule Take  by mouth Daily With Breakfast.   Yes Dank Adan MD   simvastatin (ZOCOR) 80 MG tablet Take 80 mg by mouth Every Night.   Yes Dank Adan MD   busPIRone (BUSPAR) 5 MG tablet Take 5 mg by mouth 3 (Three) Times a Day.    Dank Adan MD   metoprolol tartrate (LOPRESSOR) 25 MG tablet Take 1 tablet by mouth 2 (Two) Times a Day. 3/18/18   Bruce Chávez Jr., MD       Current Facility-Administered Medications   Medication Dose Route Frequency Provider Last Rate Last Dose   • acetaminophen (TYLENOL) tablet 650 mg  650 mg Oral Q4H PRN Elisha Barron APRN        Or   • acetaminophen (TYLENOL) 160 MG/5ML solution 650 mg  650 mg Oral Q4H PRN Elisha Barron APRN        Or   • acetaminophen (TYLENOL) suppository 650 mg  650 mg Rectal Q4H PRN Elisha Barron APRN       • albuterol (PROVENTIL) nebulizer solution 0.083% 2.5 mg/3mL  2.5 mg Nebulization Q6H PRN Elisha Barron APRN       • amLODIPine (NORVASC) tablet 5 mg  5 mg Oral Daily Elisha Barron APRN   5 mg at 02/06/20 1215   • aspirin chewable tablet 81 mg  81 mg Oral Daily Elisha Barron APRN   81 mg at 02/06/20 1215   • atorvastatin (LIPITOR) tablet 40 mg  40 mg Oral Daily Elisha Barron APRN   40 mg at 02/06/20 1215   • bisacodyl (DULCOLAX) EC tablet 5 mg  5 mg Oral Daily PRN Elisha Barron  ITZEL APRITZEL       • busPIRone (BUSPAR) tablet 5 mg  5 mg Oral TID Elisha Barron APRN   5 mg at 02/06/20 1215   • calcium carbonate (TUMS) chewable tablet 500 mg (200 mg elemental)  2 tablet Oral BID PRN Elisha Barron APRN       • enoxaparin (LOVENOX) syringe 40 mg  40 mg Subcutaneous Q24H Elisha Barron APRN   40 mg at 02/06/20 1215   • furosemide (LASIX) tablet 20 mg  20 mg Oral BID Elisha Barron APRN   20 mg at 02/06/20 1215   • losartan (COZAAR) tablet 25 mg  25 mg Oral Q24H Elisha Barron APRN       • metoprolol tartrate (LOPRESSOR) tablet 25 mg  25 mg Oral BID Elisha Barron APRN   25 mg at 02/06/20 1215   • nitroglycerin (NITROSTAT) SL tablet 0.4 mg  0.4 mg Sublingual Q5 Min PRN Elisha Barron APRN       • ondansetron (ZOFRAN) tablet 4 mg  4 mg Oral Q6H PRN Elisha Barron APRN        Or   • ondansetron (ZOFRAN) injection 4 mg  4 mg Intravenous Q6H PRN Elisha Barron APRN       • sodium chloride 0.9 % flush 10 mL  10 mL Intravenous Q12H Elisha Barron APRN   10 mL at 02/06/20 1217   • sodium chloride 0.9 % flush 10 mL  10 mL Intravenous PRN Elisha Barron APRN            Allergies:  Patient has no known allergies.    Objective     Vital Signs  Temp:  [97.1 °F (36.2 °C)-98.4 °F (36.9 °C)] 97.6 °F (36.4 °C)  Heart Rate:  [60-71] 68  Resp:  [16-18] 18  BP: (129-166)/(64-77) 166/77    Labs  Lab Results (last 72 hours)     Procedure Component Value Units Date/Time    Comprehensive Metabolic Panel [[id number]]  (Abnormal) Collected:  02/06/20 0145    Specimen:  Blood Updated:  02/06/20 0213     Glucose 112 mg/dL      BUN 10 mg/dL      Creatinine 0.75 mg/dL      Sodium 143 mmol/L      Potassium 3.8 mmol/L      Chloride 107 mmol/L      CO2 27.0 mmol/L      Calcium 9.0 mg/dL      Total Protein 6.9 g/dL      Albumin 4.00 g/dL      ALT (SGPT) 27 U/L      AST (SGOT) 26 U/L      Alkaline Phosphatase 92 U/L      Total Bilirubin 0.6 mg/dL      eGFR Non African Amer 99 mL/min/1.73      Globulin 2.9 gm/dL         A/G Ratio 1.4 g/dL      BUN/Creatinine Ratio 13.3     Anion Gap 9.0 mmol/L     Narrative:       GFR Normal >60  Chronic Kidney Disease <60  Kidney Failure <15      Lipase [[id number]]  (Normal) Collected:  02/06/20 0145    Specimen:  Blood Updated:  02/06/20 0213     Lipase 17 U/L     Troponin [[id number]]  (Normal) Collected:  02/06/20 0145    Specimen:  Blood Updated:  02/06/20 0213     Troponin T <0.010 ng/mL     Narrative:       Troponin T Reference Range:  <= 0.03 ng/mL-   Negative for AMI  >0.03 ng/mL-     Abnormal for myocardial necrosis.  Clinicians would have to utilize clinical acumen, EKG, Troponin and serial changes to determine if it is an Acute Myocardial Infarction or myocardial injury due to an underlying chronic condition.       Results may be falsely decreased if patient taking Biotin.      BNP [[id number]]  (Normal) Collected:  02/06/20 0145    Specimen:  Blood Updated:  02/06/20 0213     proBNP 306.4 pg/mL     Narrative:       Among patients with dyspnea, NT-proBNP is highly sensitive for the detection of acute congestive heart failure. In addition NT-proBNP of <300 pg/ml effectively rules out acute congestive heart failure with 99% negative predictive value.    Results may be falsely decreased if patient taking Biotin.      D-dimer, Quantitative [[id number]]  (Abnormal) Collected:  02/06/20 0145    Specimen:  Blood Updated:  02/06/20 0210     D-Dimer, Quantitative 0.70 mg/L (FEU)     Narrative:       Reference Range is 0-0.50 mg/L FEU. However, results <0.50 mg/L FEU tends to rule out DVT or PE. Results >0.50 mg/L FEU are not useful in predicting absence or presence of DVT or PE.      CBC & Differential [[id number]] Collected:  02/06/20 0145    Specimen:  Blood Updated:  02/06/20 0154    Narrative:       The following orders were created for panel order CBC & Differential.  Procedure                               Abnormality         Status                     ---------                                -----------         ------                     CBC Auto Differential[[id number]]        Abnormal            Final result                 Please view results for these tests on the individual orders.    CBC Auto Differential [[id number]]  (Abnormal) Collected:  02/06/20 0145    Specimen:  Blood Updated:  02/06/20 0154     WBC 9.43 10*3/mm3      RBC 4.79 10*6/mm3      Hemoglobin 14.6 g/dL      Hematocrit 42.7 %      MCV 89.1 fL      MCH 30.5 pg      MCHC 34.2 g/dL      RDW 13.8 %      RDW-SD 45.1 fl      MPV 12.5 fL      Platelets 193 10*3/mm3      Neutrophil % 57.3 %      Lymphocyte % 27.3 %      Monocyte % 9.7 %      Eosinophil % 4.5 %      Basophil % 1.0 %      Immature Grans % 0.2 %      Neutrophils, Absolute 5.42 10*3/mm3      Lymphocytes, Absolute 2.57 10*3/mm3      Monocytes, Absolute 0.91 10*3/mm3      Eosinophils, Absolute 0.42 10*3/mm3      Basophils, Absolute 0.09 10*3/mm3      Immature Grans, Absolute 0.02 10*3/mm3      nRBC 0.0 /100 WBC           Physical Exam:  Physical Exam   Constitutional: He is oriented to person, place, and time. Vital signs are normal. He appears well-developed and well-nourished. No distress.   HENT:   Head: Normocephalic and atraumatic.   Right Ear: External ear normal.   Left Ear: External ear normal.   Eyes: Pupils are equal, round, and reactive to light. Conjunctivae are normal. Right eye exhibits no discharge. Left eye exhibits no discharge.   Neck: Normal range of motion. Neck supple. No JVD present. Carotid bruit is not present. No thyromegaly present.   Cardiovascular: Normal rate, regular rhythm, normal heart sounds and intact distal pulses. PMI is not displaced. Exam reveals no gallop, no friction rub and no decreased pulses.   No murmur heard.  Pulses:       Radial pulses are 2+ on the right side, and 2+ on the left side.        Dorsalis pedis pulses are 2+ on the right side, and 2+ on the left side.        Posterior tibial pulses are 2+ on the right side, and 2+ on the  left side.   Pulmonary/Chest: Effort normal and breath sounds normal. No respiratory distress. He has no decreased breath sounds. He has no wheezes. He has no rhonchi. He has no rales. He exhibits no tenderness.   Abdominal: Soft. Bowel sounds are normal. He exhibits no distension. There is no tenderness.   Musculoskeletal: Normal range of motion. He exhibits no edema.   Neurological: He is alert and oriented to person, place, and time.   Skin: Skin is warm and dry. No rash noted. He is not diaphoretic. No erythema. No pallor.   Psychiatric: He has a normal mood and affect. His behavior is normal. Judgment and thought content normal.   Vitals reviewed.      Results Review:    I reviewed the patient's new clinical results.    Assessment/Plan       Coronary artery disease involving native coronary artery of native heart with unstable angina pectoris (CMS/HCC)    S/P CABG x 3    Presence of drug coated stent in LAD coronary artery    S/P cardiac pacemaker procedure    Sick sinus syndrome (CMS/HCC)    Essential hypertension    Mixed hyperlipidemia      Plan    Admitted to  telemetry unit for observation.  Stress echo pending.  N.p.o. for now.  Resume home medications as ordered.  Routine vitals.  Check lipid panel and hemoglobin A1c.  Prophylactic Lovenox dosing for DVT prophylaxis.  Pacemaker interrogation.      I discussed the patients findings and my recommendations with patient and nursing staff.    Elisha Barron, GABRIEL  02/06/20  3:17 PM    Time: 45 minutes     Addendum: Dobutamine stress echo was low risk for ischemia.  Stable for discharge from cardiology standpoint.   Will start losartan 25 mg daily, as blood pressures have been elevated during admission.  Follow up with cardiology office in two weeks. Consider adding Imdur at that time.   Pt instructed to report any worsening symptoms.  Cardiac diet.  Activity as tolerated.

## 2020-02-26 ENCOUNTER — CLINICAL SUPPORT NO REQUIREMENTS (OUTPATIENT)
Dept: CARDIOLOGY | Facility: CLINIC | Age: 85
End: 2020-02-26

## 2020-02-26 ENCOUNTER — OFFICE VISIT (OUTPATIENT)
Dept: CARDIOLOGY | Facility: CLINIC | Age: 85
End: 2020-02-26

## 2020-02-26 VITALS
WEIGHT: 185 LBS | RESPIRATION RATE: 18 BRPM | OXYGEN SATURATION: 98 % | SYSTOLIC BLOOD PRESSURE: 125 MMHG | DIASTOLIC BLOOD PRESSURE: 70 MMHG | BODY MASS INDEX: 25.9 KG/M2 | HEART RATE: 65 BPM | HEIGHT: 71 IN

## 2020-02-26 DIAGNOSIS — Z95.0 S/P CARDIAC PACEMAKER PROCEDURE: ICD-10-CM

## 2020-02-26 DIAGNOSIS — Z95.5 PRESENCE OF DRUG COATED STENT IN LAD CORONARY ARTERY: ICD-10-CM

## 2020-02-26 DIAGNOSIS — I10 ESSENTIAL HYPERTENSION: ICD-10-CM

## 2020-02-26 DIAGNOSIS — I47.29 NSVT (NONSUSTAINED VENTRICULAR TACHYCARDIA) (HCC): ICD-10-CM

## 2020-02-26 DIAGNOSIS — Z95.1 S/P CABG X 3: ICD-10-CM

## 2020-02-26 DIAGNOSIS — I44.1 MOBITZ TYPE 2 SECOND DEGREE HEART BLOCK: ICD-10-CM

## 2020-02-26 DIAGNOSIS — I25.110 CORONARY ARTERY DISEASE INVOLVING NATIVE CORONARY ARTERY OF NATIVE HEART WITH UNSTABLE ANGINA PECTORIS (HCC): Primary | ICD-10-CM

## 2020-02-26 DIAGNOSIS — I49.5 SICK SINUS SYNDROME (HCC): ICD-10-CM

## 2020-02-26 DIAGNOSIS — Z95.0 S/P CARDIAC PACEMAKER PROCEDURE: Primary | ICD-10-CM

## 2020-02-26 DIAGNOSIS — R60.0 BILATERAL LOWER EXTREMITY EDEMA: ICD-10-CM

## 2020-02-26 DIAGNOSIS — E78.2 MIXED HYPERLIPIDEMIA: ICD-10-CM

## 2020-02-26 PROCEDURE — 99214 OFFICE O/P EST MOD 30 MIN: CPT | Performed by: NURSE PRACTITIONER

## 2020-02-26 PROCEDURE — 93288 INTERROG EVL PM/LDLS PM IP: CPT | Performed by: INTERNAL MEDICINE

## 2020-02-26 PROCEDURE — 93000 ELECTROCARDIOGRAM COMPLETE: CPT | Performed by: NURSE PRACTITIONER

## 2020-02-26 NOTE — PROGRESS NOTES
Dual Chamber Pacemaker Evaluation Report  IN OFFICE INTERROGATION    February 26, 2020    Primary Cardiologist: Jessa  : Guidant Model: [employer] MRI EL L131  Implant date: 7/26/2017    Reason for evaluation: routine  Indication for pacemaker: complete heart block and second degree AV block    Measurements  Atrial sensing - P wave: 6.7 mV  Atrial threshold: 1.2V@ 0.4ms  Atrial lead impedance: 727 ohms  Ventricular sensing - R wave: 8.6 mV  Ventricular threshold: 0.9 V @ 0.4 ms  Ventricular lead impedance:   659 ohms     Diagnostic Data  Atrial paced: 54 %  Ventricular paced: 100 %    Episodes recorded since 11/6/2019:  PMT episodes are sinus tachycardia at max tracking rate of 120 bpm.  No other episodes.    Battery status: satisfactory   Estimated 11.5 years remaining    Final Parameters  Mode:  DDDR  Lower rate: 60 bpm   Upper rate: 120 bpm  AV Delay: paced- 120-180 ms  Dmzigo-229-145 ms  Atrial - Amplitude: 2.4 V   Pulse width: 0.4 ms   Sensitivity: 0.25 mV     Ventricular - Amplitude: 1.4 V  Pulse width: 0.4 ms  Sensitivity: 0.6 mV    Changes made: No changes made.  Conclusions: normal pacemaker function, stable pacing and sensing thresholds and adequate battery reserve    Follow up: Every 3 months via latitude, annually in office (3/2/2021)

## 2020-02-26 NOTE — PROGRESS NOTES
Subjective:     Encounter Date:02/26/2020      Patient ID: Dyllan Greene is a 84 y.o. male. He presents today for routine follow up of hospital discharge on 2/6/2020 for chest pain.  Dobutamine stress echo at that time was low risk for ischemia.  He was started on losartan 25 mg daily for uncontrolled hypertension during that admission. 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  He reports intermittent bilateral lower extremity edema.  He denies chest pain, shortness of breath, palpitations, dizziness, syncope, orthopnea, PND or decreased stamina. He states that overall he has felt well since discharge.      Chief Complaint:routine follow up  Coronary Artery Disease   Presents for follow-up visit. Symptoms include leg swelling. Pertinent negatives include no chest pain, chest pressure, chest tightness, dizziness, muscle weakness, palpitations, shortness of breath or weight gain. Risk factors include hyperlipidemia. The symptoms have been stable. Compliance with diet is variable. Compliance with exercise is variable. Compliance with medications is good.   Hypertension   This is a chronic problem. The current episode started more than 1 year ago. The problem is controlled. Associated symptoms include sweats. Pertinent negatives include no anxiety, blurred vision, chest pain, headaches, malaise/fatigue, neck pain, orthopnea, palpitations, peripheral edema, PND or shortness of breath. Risk factors for coronary artery disease include male gender and dyslipidemia. Current antihypertension treatment includes beta blockers and calcium channel blockers. The current treatment provides significant improvement. Hypertensive end-organ damage includes CAD/MI.   Hyperlipidemia   This is a chronic problem. The current episode started more than 1 year ago. The  problem is controlled. Pertinent negatives include no chest pain or shortness of breath. Current antihyperlipidemic treatment includes statins.       The following portions of the patient's history were reviewed and updated as appropriate: allergies, current medications, past family history, past medical history, past social history, past surgical history and problem list.     No Known Allergies    Current Outpatient Medications:   •  albuterol (VENTOLIN HFA) 108 (90 BASE) MCG/ACT inhaler, Inhale 2 puffs Every 4 (Four) Hours As Needed for Wheezing., Disp: , Rfl:   •  amLODIPine (NORVASC) 5 MG tablet, TAKE 1 TABLET BY MOUTH DAILY, Disp: 30 tablet, Rfl: 0  •  aspirin 81 MG chewable tablet, Chew 81 mg Daily., Disp: , Rfl:   •  busPIRone (BUSPAR) 5 MG tablet, Take 5 mg by mouth 3 (Three) Times a Day., Disp: , Rfl:   •  losartan (COZAAR) 25 MG tablet, Take 1 tablet by mouth Daily., Disp: 90 tablet, Rfl: 3  •  metoprolol tartrate (LOPRESSOR) 25 MG tablet, Take 1 tablet by mouth 2 (Two) Times a Day., Disp: 60 tablet, Rfl: 0  •  nitroglycerin (NITROSTAT) 0.4 MG SL tablet, Place 0.4 mg under the tongue Every 5 (Five) Minutes As Needed for Chest Pain. Take no more than 3 doses in 15 minutes., Disp: , Rfl:   •  Omega-3 Fatty Acids (FISH OIL) 1000 MG capsule capsule, Take  by mouth Daily With Breakfast., Disp: , Rfl:   •  simvastatin (ZOCOR) 80 MG tablet, Take 80 mg by mouth Every Night., Disp: , Rfl:   Past Medical History:   Diagnosis Date   • Asthma    • Benign hypertension    • CAD in native artery    • Chest pain     CARDIAC AND NON-CARDIAC   • Chest tightness    • COPD (chronic obstructive pulmonary disease) (CMS/HCC)    • Hyperlipidemia    • Hyperlipidemia    • Hypertension    • Myocardial infarct (CMS/HCC)    • S/P CABG x 4     x3 6/20/2003   • SOB (shortness of breath)      MAY BE AN ANIGNAL EQUIVALENT     Past Surgical History:   Procedure Laterality Date   • CARDIAC ELECTROPHYSIOLOGY PROCEDURE N/A 7/26/2017     Procedure: Pacemaker SC new;  Surgeon: Andrew Germain MD;  Location:  [facility] CATH INVASIVE LOCATION;  Service:    • CHOLECYSTECTOMY     • CORONARY ARTERY BYPASS GRAFT     • HEMORRHOIDECTOMY     • HIP SURGERY     • JOINT REPLACEMENT       Family History   Problem Relation Age of Onset   • Coronary artery disease Mother    • Liver disease Father      Social History     Socioeconomic History   • Marital status:      Spouse name: Not on file   • Number of children: Not on file   • Years of education: Not on file   • Highest education level: Not on file   Tobacco Use   • Smoking status: Former Smoker   • Smokeless tobacco: Never Used   • Tobacco comment: quit 1984   Substance and Sexual Activity   • Alcohol use: No   • Drug use: No   • Sexual activity: Defer         Review of Systems   Constitution: Negative for chills, decreased appetite, diaphoresis, fever, malaise/fatigue, night sweats, weight gain and weight loss.   HENT: Negative for nosebleeds.    Eyes: Negative for blurred vision and visual disturbance.   Cardiovascular: Positive for leg swelling. Negative for chest pain, dyspnea on exertion, near-syncope, orthopnea, palpitations, paroxysmal nocturnal dyspnea and syncope.   Respiratory: Negative for chest tightness, cough, hemoptysis, shortness of breath, snoring and wheezing.    Endocrine: Negative for cold intolerance and heat intolerance.   Hematologic/Lymphatic: Does not bruise/bleed easily.   Skin: Negative for rash.   Musculoskeletal: Negative for back pain, falls, muscle weakness and neck pain.   Gastrointestinal: Negative for abdominal pain, change in bowel habit, constipation, diarrhea, dysphagia, heartburn, nausea and vomiting.   Genitourinary: Negative for hematuria.   Neurological: Negative for dizziness, headaches, light-headedness, tremors and weakness.   Psychiatric/Behavioral: Negative for altered mental status.   Allergic/Immunologic: Negative for persistent infections.         ECG  "12 Lead  Date/Time: 2/26/2020 2:42 PM  Performed by: Elisha Barron APRN  Authorized by: Elisha Barron APRN   Comparison: compared with previous ECG from 2/6/2020  Similar to previous ECG  Rhythm: paced  Rate: normal  BPM: 65    Clinical impression: abnormal EKG          /70 (BP Location: Right arm, Patient Position: Sitting, Cuff Size: Adult)   Pulse 65   Resp 18   Ht 180.3 cm (71\")   Wt 83.9 kg (185 lb)   SpO2 98%   BMI 25.80 kg/m²        Objective:     Physical Exam   Constitutional: He is oriented to person, place, and time. Vital signs are normal. He appears well-developed and well-nourished. No distress.   HENT:   Head: Normocephalic and atraumatic.   Right Ear: External ear normal.   Left Ear: External ear normal.   Eyes: Pupils are equal, round, and reactive to light. Conjunctivae are normal. Right eye exhibits no discharge. Left eye exhibits no discharge.   Neck: Normal range of motion. Neck supple. No JVD present. Carotid bruit is not present. No thyromegaly present.   Cardiovascular: Normal rate, regular rhythm, normal heart sounds and intact distal pulses. PMI is not displaced. Exam reveals no gallop, no friction rub and no decreased pulses.   No murmur heard.  Pulses:       Radial pulses are 2+ on the right side, and 2+ on the left side.        Dorsalis pedis pulses are 2+ on the right side, and 2+ on the left side.        Posterior tibial pulses are 2+ on the right side, and 2+ on the left side.   Pulmonary/Chest: Effort normal and breath sounds normal. No respiratory distress. He has no decreased breath sounds. He has no wheezes. He has no rhonchi. He has no rales. He exhibits no tenderness.   Abdominal: Soft. Bowel sounds are normal. He exhibits no distension. There is no tenderness.   Musculoskeletal: Normal range of motion. He exhibits edema (Mild bilateral lower extremtiy edema).   Neurological: He is alert and oriented to person, place, and time.   Skin: Skin is warm and dry. No " rash noted. He is not diaphoretic. No erythema. No pallor.   Psychiatric: He has a normal mood and affect. His behavior is normal. Judgment and thought content normal.   Vitals reviewed.      Lab Review:   Lab Results   Component Value Date    WBC 9.43 02/06/2020    HGB 14.6 02/06/2020    HCT 42.7 02/06/2020    MCV 89.1 02/06/2020     02/06/2020     Lab Results   Component Value Date    GLUCOSE 112 (H) 02/06/2020    BUN 10 02/06/2020    CREATININE 0.75 (L) 02/06/2020    EGFRIFNONA 99 02/06/2020    BCR 13.3 02/06/2020    K 3.8 02/06/2020    CO2 27.0 02/06/2020    CALCIUM 9.0 02/06/2020    ALBUMIN 4.00 02/06/2020    AST 26 02/06/2020    ALT 27 02/06/2020         Assessment:          Diagnosis Plan   1. Coronary artery disease involving native coronary artery of native heart without angina pectoris  No clinical signs of ischemia.    2. Presence of drug coated stent in LAD coronary artery Remotely.   3. S/P CABG x 3 Continues on aspirin, BB and statin.    4. Essential hypertension  Well controlled.   5. Mixed hyperlipidemia  Managed by PCP. On statin.    6. Mobitz type 2 second degree heart block  Paced.    7. NSVT (nonsustained ventricular tachycardia)  Resolved.    8. Sick sinus syndrome  Paced.    9. S/P cardiac pacemaker procedure  Interrogated 12/5/2019.  57% atrial paced, 100% ventricular paced, episodes of sinus tachycardia.  Normal function, stable thresholds and adequate battery. Interrogated in office today 54% a-paced, 100% v-paced, no episodes, adequate battery. Full report pending.    10. Bilateral lower extremity edema 2d echo.           Plan:       1. Continue medications as previously prescribed.  2. Report any worsening symptoms.  3. Report any signs of bleeding.  4. Continue heart healthy diet and regular exercise as tolerated.   5. Follow up with PCP for blood pressure and cholesterol management and routine lab work.  6. Follow up with mid-level provider in three months, or sooner if needed.    7. Follow up with pacemaker clinic as scheduled.   8. 2D echo.

## 2020-03-10 ENCOUNTER — HOSPITAL ENCOUNTER (OUTPATIENT)
Dept: CARDIOLOGY | Facility: HOSPITAL | Age: 85
Discharge: HOME | End: 2020-03-10
Admitting: NURSE PRACTITIONER

## 2020-03-10 ENCOUNTER — TELEPHONE (OUTPATIENT)
Dept: CARDIOLOGY | Facility: CLINIC | Age: 85
End: 2020-03-10

## 2020-03-10 VITALS
SYSTOLIC BLOOD PRESSURE: 125 MMHG | HEIGHT: 71 IN | DIASTOLIC BLOOD PRESSURE: 70 MMHG | WEIGHT: 184.97 LBS | BODY MASS INDEX: 25.9 KG/M2

## 2020-03-10 DIAGNOSIS — R60.0 BILATERAL LOWER EXTREMITY EDEMA: ICD-10-CM

## 2020-03-10 LAB
BH CV ECHO MEAS - AO MAX PG (FULL): 2.8 MMHG
BH CV ECHO MEAS - AO MAX PG: 8.2 MMHG
BH CV ECHO MEAS - AO MEAN PG (FULL): 2 MMHG
BH CV ECHO MEAS - AO MEAN PG: 5 MMHG
BH CV ECHO MEAS - AO ROOT AREA (BSA CORRECTED): 1.9
BH CV ECHO MEAS - AO ROOT AREA: 11.3 CM^2
BH CV ECHO MEAS - AO ROOT DIAM: 3.8 CM
BH CV ECHO MEAS - AO V2 MAX: 143 CM/SEC
BH CV ECHO MEAS - AO V2 MEAN: 110 CM/SEC
BH CV ECHO MEAS - AO V2 VTI: 37.5 CM
BH CV ECHO MEAS - AVA(I,A): 2.1 CM^2
BH CV ECHO MEAS - AVA(I,D): 2.1 CM^2
BH CV ECHO MEAS - AVA(V,A): 2.3 CM^2
BH CV ECHO MEAS - AVA(V,D): 2.3 CM^2
BH CV ECHO MEAS - BSA(HAYCOCK): 2 M^2
BH CV ECHO MEAS - BSA: 2 M^2
BH CV ECHO MEAS - BZI_BMI: 26.4 KILOGRAMS/M^2
BH CV ECHO MEAS - BZI_METRIC_HEIGHT: 177.8 CM
BH CV ECHO MEAS - BZI_METRIC_WEIGHT: 83.5 KG
BH CV ECHO MEAS - EDV(CUBED): 58.9 ML
BH CV ECHO MEAS - EDV(MOD-SP4): 77.2 ML
BH CV ECHO MEAS - EDV(TEICH): 65.5 ML
BH CV ECHO MEAS - EF(CUBED): 62.7 %
BH CV ECHO MEAS - EF(MOD-SP4): 61.1 %
BH CV ECHO MEAS - EF(TEICH): 54.9 %
BH CV ECHO MEAS - ESV(CUBED): 22 ML
BH CV ECHO MEAS - ESV(MOD-SP4): 30 ML
BH CV ECHO MEAS - ESV(TEICH): 29.6 ML
BH CV ECHO MEAS - FS: 28 %
BH CV ECHO MEAS - IVS/LVPW: 0.88
BH CV ECHO MEAS - IVSD: 1.2 CM
BH CV ECHO MEAS - LA DIMENSION: 3.8 CM
BH CV ECHO MEAS - LA/AO: 1
BH CV ECHO MEAS - LAT PEAK E' VEL: 7.9 CM/SEC
BH CV ECHO MEAS - LV DIASTOLIC VOL/BSA (35-75): 38.3 ML/M^2
BH CV ECHO MEAS - LV MASS(C)D: 178 GRAMS
BH CV ECHO MEAS - LV MASS(C)DI: 88.4 GRAMS/M^2
BH CV ECHO MEAS - LV MAX PG: 5.4 MMHG
BH CV ECHO MEAS - LV MEAN PG: 3 MMHG
BH CV ECHO MEAS - LV SYSTOLIC VOL/BSA (12-30): 14.9 ML/M^2
BH CV ECHO MEAS - LV V1 MAX: 116 CM/SEC
BH CV ECHO MEAS - LV V1 MEAN: 86.8 CM/SEC
BH CV ECHO MEAS - LV V1 VTI: 28 CM
BH CV ECHO MEAS - LVIDD: 3.9 CM
BH CV ECHO MEAS - LVIDS: 2.8 CM
BH CV ECHO MEAS - LVLD AP4: 7.6 CM
BH CV ECHO MEAS - LVLS AP4: 6.4 CM
BH CV ECHO MEAS - LVOT AREA (M): 2.8 CM^2
BH CV ECHO MEAS - LVOT AREA: 2.8 CM^2
BH CV ECHO MEAS - LVOT DIAM: 1.9 CM
BH CV ECHO MEAS - LVPWD: 1.4 CM
BH CV ECHO MEAS - MED PEAK E' VEL: 7.1 CM/SEC
BH CV ECHO MEAS - MR MAX PG: 29.2 MMHG
BH CV ECHO MEAS - MR MAX VEL: 270 CM/SEC
BH CV ECHO MEAS - MV A MAX VEL: 80.1 CM/SEC
BH CV ECHO MEAS - MV DEC SLOPE: 313 CM/SEC^2
BH CV ECHO MEAS - MV DEC TIME: 0.28 SEC
BH CV ECHO MEAS - MV E MAX VEL: 87 CM/SEC
BH CV ECHO MEAS - MV E/A: 1.1
BH CV ECHO MEAS - RAP SYSTOLE: 10 MMHG
BH CV ECHO MEAS - RVSP: 38.9 MMHG
BH CV ECHO MEAS - SI(AO): 211.1 ML/M^2
BH CV ECHO MEAS - SI(CUBED): 18.3 ML/M^2
BH CV ECHO MEAS - SI(LVOT): 39.4 ML/M^2
BH CV ECHO MEAS - SI(MOD-SP4): 23.4 ML/M^2
BH CV ECHO MEAS - SI(TEICH): 17.8 ML/M^2
BH CV ECHO MEAS - SV(AO): 425.3 ML
BH CV ECHO MEAS - SV(CUBED): 36.9 ML
BH CV ECHO MEAS - SV(LVOT): 79.4 ML
BH CV ECHO MEAS - SV(MOD-SP4): 47.2 ML
BH CV ECHO MEAS - SV(TEICH): 36 ML
BH CV ECHO MEAS - TR MAX VEL: 269 CM/SEC
BH CV ECHO MEASUREMENTS AVERAGE E/E' RATIO: 11.6
LEFT ATRIUM VOLUME INDEX: 14.8 ML/M2
LEFT ATRIUM VOLUME: 29.8 CM3
MAXIMAL PREDICTED HEART RATE: 137 BPM
STRESS TARGET HR: 116 BPM

## 2020-03-10 PROCEDURE — 25010000002 PERFLUTREN 6.52 MG/ML SUSPENSION: Performed by: INTERNAL MEDICINE

## 2020-03-10 PROCEDURE — 93306 TTE W/DOPPLER COMPLETE: CPT | Performed by: INTERNAL MEDICINE

## 2020-03-10 PROCEDURE — 93306 TTE W/DOPPLER COMPLETE: CPT

## 2020-03-10 RX ADMIN — PERFLUTREN 8.48 MG: 6.52 INJECTION, SUSPENSION INTRAVENOUS at 10:24

## 2020-03-10 NOTE — TELEPHONE ENCOUNTER
----- Message from GABRIEL uVong sent at 3/10/2020  2:53 PM CDT -----  Please let this pt know that his echo was normal for his age. No change in treatment plan. Keep scheduled follow up.

## 2020-05-27 ENCOUNTER — CLINICAL SUPPORT (OUTPATIENT)
Dept: CARDIOLOGY | Facility: CLINIC | Age: 85
End: 2020-05-27

## 2020-05-27 DIAGNOSIS — I44.2 COMPLETE HEART BLOCK (HCC): ICD-10-CM

## 2020-05-27 PROCEDURE — 93296 REM INTERROG EVL PM/IDS: CPT | Performed by: PHYSICIAN ASSISTANT

## 2020-05-27 PROCEDURE — 93294 REM INTERROG EVL PM/LDLS PM: CPT | Performed by: PHYSICIAN ASSISTANT

## 2020-08-24 ENCOUNTER — OFFICE VISIT (OUTPATIENT)
Dept: CARDIOLOGY | Facility: CLINIC | Age: 85
End: 2020-08-24

## 2020-08-24 VITALS
RESPIRATION RATE: 18 BRPM | SYSTOLIC BLOOD PRESSURE: 120 MMHG | DIASTOLIC BLOOD PRESSURE: 70 MMHG | OXYGEN SATURATION: 98 % | HEIGHT: 72 IN | HEART RATE: 60 BPM | BODY MASS INDEX: 25.06 KG/M2 | WEIGHT: 185 LBS

## 2020-08-24 DIAGNOSIS — I25.10 CORONARY ARTERY DISEASE INVOLVING NATIVE CORONARY ARTERY OF NATIVE HEART WITHOUT ANGINA PECTORIS: Primary | ICD-10-CM

## 2020-08-24 DIAGNOSIS — I47.29 NSVT (NONSUSTAINED VENTRICULAR TACHYCARDIA) (HCC): ICD-10-CM

## 2020-08-24 DIAGNOSIS — E78.2 MIXED HYPERLIPIDEMIA: ICD-10-CM

## 2020-08-24 DIAGNOSIS — Z95.0 S/P CARDIAC PACEMAKER PROCEDURE: ICD-10-CM

## 2020-08-24 DIAGNOSIS — I44.1 MOBITZ TYPE 2 SECOND DEGREE HEART BLOCK: ICD-10-CM

## 2020-08-24 DIAGNOSIS — I10 ESSENTIAL HYPERTENSION: ICD-10-CM

## 2020-08-24 PROCEDURE — 93000 ELECTROCARDIOGRAM COMPLETE: CPT | Performed by: NURSE PRACTITIONER

## 2020-08-24 PROCEDURE — 99214 OFFICE O/P EST MOD 30 MIN: CPT | Performed by: NURSE PRACTITIONER

## 2020-08-24 NOTE — PROGRESS NOTES
Subjective:     Encounter Date:08/24/2020      Patient ID: Dyllan Greene is a 84 y.o. male     Chief Complaint:  Coronary Artery Disease   Presents for follow-up visit. Symptoms include leg swelling. Pertinent negatives include no chest pain, chest pressure, chest tightness, dizziness, muscle weakness, palpitations, shortness of breath or weight gain. Risk factors include hyperlipidemia. The symptoms have been stable. Compliance with diet is good. Compliance with exercise is good. Compliance with medications is good.   Hyperlipidemia   This is a chronic problem. The current episode started more than 1 year ago. The problem is controlled. Recent lipid tests were reviewed and are normal. Pertinent negatives include no chest pain or shortness of breath. Current antihyperlipidemic treatment includes statins. The current treatment provides no improvement of lipids. There are no compliance problems.  There are no known risk factors for coronary artery disease.   Hypertension   This is a chronic problem. The current episode started more than 1 year ago. The problem is controlled. Associated symptoms include malaise/fatigue. Pertinent negatives include no chest pain, headaches, orthopnea, palpitations, PND or shortness of breath. Past treatments include calcium channel blockers, beta blockers and angiotensin blockers. The current treatment provides significant improvement. There are no compliance problems.      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 Patient was seen in the hospital in February for chest pain and had a low risk stress test. He was also started on Losartan due to elevated blood pressure. Overall he has been doing well. He does complain of chronic leg swelling- no change.Patient is active with minimal limitations.     The  following portions of the patient's history were reviewed and updated as appropriate: allergies, current medications, past medical history, past social history, past and problem list.    No Known Allergies    Current Outpatient Medications:   •  albuterol (VENTOLIN HFA) 108 (90 BASE) MCG/ACT inhaler, Inhale 2 puffs Every 4 (Four) Hours As Needed for Wheezing., Disp: , Rfl:   •  amLODIPine (NORVASC) 5 MG tablet, TAKE 1 TABLET BY MOUTH DAILY, Disp: 30 tablet, Rfl: 0  •  aspirin 81 MG chewable tablet, Chew 81 mg Daily., Disp: , Rfl:   •  busPIRone (BUSPAR) 5 MG tablet, Take 5 mg by mouth 3 (Three) Times a Day., Disp: , Rfl:   •  losartan (COZAAR) 25 MG tablet, Take 1 tablet by mouth Daily., Disp: 90 tablet, Rfl: 3  •  metoprolol tartrate (LOPRESSOR) 25 MG tablet, Take 1 tablet by mouth 2 (Two) Times a Day., Disp: 60 tablet, Rfl: 0  •  nitroglycerin (NITROSTAT) 0.4 MG SL tablet, Place 0.4 mg under the tongue Every 5 (Five) Minutes As Needed for Chest Pain. Take no more than 3 doses in 15 minutes., Disp: , Rfl:   •  Omega-3 Fatty Acids (FISH OIL) 1000 MG capsule capsule, Take  by mouth Daily With Breakfast., Disp: , Rfl:   •  simvastatin (ZOCOR) 80 MG tablet, Take 80 mg by mouth Every Night., Disp: , Rfl:     Social History     Socioeconomic History   • Marital status:      Spouse name: Not on file   • Number of children: Not on file   • Years of education: Not on file   • Highest education level: Not on file   Tobacco Use   • Smoking status: Former Smoker   • Smokeless tobacco: Never Used   • Tobacco comment: quit 1984   Substance and Sexual Activity   • Alcohol use: No   • Drug use: No   • Sexual activity: Defer       Review of Systems   Constitution: Positive for malaise/fatigue. Negative for chills, decreased appetite, fever, weight gain and weight loss.   HENT: Negative for nosebleeds.    Eyes: Negative for visual disturbance.   Cardiovascular: Positive for leg swelling. Negative for chest pain, dyspnea on  exertion, near-syncope, orthopnea, palpitations, paroxysmal nocturnal dyspnea and syncope.   Respiratory: Negative for chest tightness, cough, hemoptysis, shortness of breath and snoring.    Endocrine: Negative for cold intolerance and heat intolerance.   Hematologic/Lymphatic: Negative for bleeding problem. Does not bruise/bleed easily.   Skin: Negative for rash.   Musculoskeletal: Negative for back pain, falls and muscle weakness.   Gastrointestinal: Negative for abdominal pain, constipation, diarrhea, heartburn, melena, nausea and vomiting.   Genitourinary: Negative for hematuria.   Neurological: Negative for dizziness, headaches and light-headedness.   Psychiatric/Behavioral: Negative for altered mental status.   Allergic/Immunologic: Negative for persistent infections.              Objective:     Physical Exam   Constitutional: He is oriented to person, place, and time. He appears well-developed and well-nourished.   HENT:   Head: Normocephalic and atraumatic.   Eyes: Pupils are equal, round, and reactive to light.   Neck: Normal range of motion. Neck supple. No JVD present. Carotid bruit is not present.   Cardiovascular: Normal rate, regular rhythm, normal heart sounds and intact distal pulses.   Pulmonary/Chest: Effort normal and breath sounds normal.   Abdominal: Soft. Bowel sounds are normal.   Musculoskeletal: Normal range of motion.   Neurological: He is alert and oriented to person, place, and time. He has normal reflexes.   Skin: Skin is warm and dry.   Psychiatric: He has a normal mood and affect. His behavior is normal. Judgment and thought content normal.           ECG 12 Lead  Date/Time: 8/24/2020 2:15 PM  Performed by: Haresh Morse APRN  Authorized by: Haresh Morse APRN   Comparison: compared with previous ECG from 2/26/2020  Similar to previous ECG  Rhythm: paced          /70 (BP Location: Right arm, Patient Position: Sitting, Cuff Size: Adult)   Pulse 60   Resp 18   Ht 182.9 cm  "(72\")   Wt 83.9 kg (185 lb)   SpO2 98%   BMI 25.09 kg/m²   Lab Review:   I have reviewed       Lab Results   Component Value Date    CHOL 120 02/06/2020    CHLPL 131 01/19/2014    TRIG 80 02/06/2020    HDL 48 02/06/2020    LDL 56 02/06/2020      Results for orders placed during the hospital encounter of 03/10/20   Adult Transthoracic Echo Complete W/ Cont if Necessary Per Protocol    Narrative · Left ventricular wall thickness is consistent with mild concentric   hypertrophy.  · Left ventricular diastolic dysfunction (grade I a) consistent with   impaired relaxation.  · Left ventricular systolic function is normal.     ADEQUATE STUDY THANKS TO CONTRAST  NORMAL LV AND RV SYSTOLIC FUNCTION  GRADE 1A LV DIASTOLIC DYSFUNCTION  MILD PULMONARY HTN  NO SIGNIFICANT VALVULAR DYSFUNCTION        Assessment:          Diagnosis Plan   1. Coronary artery disease involving native coronary artery of native heart without angina pectoris     2. NSVT (nonsustained ventricular tachycardia) (CMS/HCC)     3. Mobitz type 2 second degree heart block     4. S/P cardiac pacemaker procedure     5. Essential hypertension     6. Mixed hyperlipidemia            Plan:       1. Coronary Artery Disease - History of CABG and stenting. No angina. On Aspirin and statin. Goal LDL less than 70  2. NSVT - history of. None on most recent check. On beta blocker  3. Mobitz Type II second degree heart block- resolved s/p pacemaker  4. Pacemaker- reviewed most recent interrogtion  5. Blood Pressure - blood pressure controlled  6. Hyperlipidemia - LDL at goal of less than 70. Continue statin    Follow up in 6 months or sooner if needed.          "

## 2020-11-02 RX ORDER — LOSARTAN POTASSIUM 25 MG/1
25 TABLET ORAL
Qty: 90 TABLET | Refills: 3 | Status: SHIPPED | OUTPATIENT
Start: 2020-11-02 | End: 2021-10-31

## 2021-03-06 PROCEDURE — 93296 REM INTERROG EVL PM/IDS: CPT | Performed by: INTERNAL MEDICINE

## 2021-03-06 PROCEDURE — 93294 REM INTERROG EVL PM/LDLS PM: CPT | Performed by: INTERNAL MEDICINE

## 2021-10-31 RX ORDER — LOSARTAN POTASSIUM 25 MG/1
25 TABLET ORAL
Qty: 90 TABLET | Refills: 3 | Status: SHIPPED | OUTPATIENT
Start: 2021-10-31 | End: 2022-10-26

## 2022-02-25 ENCOUNTER — TELEPHONE (OUTPATIENT)
Dept: CARDIOLOGY | Facility: CLINIC | Age: 87
End: 2022-02-25

## 2022-02-25 NOTE — TELEPHONE ENCOUNTER
Patient needs a 4G cellular adapter for his [employer] Latitude monitor for his pacemaker.  Address verified and cellular adapter ordered.  RN explained this to both patient and his granddaughter.  Instructed them to set it up when received.  Understanding verbalized.

## 2022-03-15 ENCOUNTER — TELEPHONE (OUTPATIENT)
Dept: CARDIOLOGY | Facility: CLINIC | Age: 87
End: 2022-03-15

## 2022-03-23 ENCOUNTER — OFFICE VISIT (OUTPATIENT)
Dept: CARDIOLOGY | Facility: CLINIC | Age: 87
End: 2022-03-23

## 2022-03-23 ENCOUNTER — CLINICAL SUPPORT NO REQUIREMENTS (OUTPATIENT)
Dept: CARDIOLOGY | Facility: CLINIC | Age: 87
End: 2022-03-23

## 2022-03-23 VITALS
SYSTOLIC BLOOD PRESSURE: 122 MMHG | WEIGHT: 193 LBS | HEART RATE: 60 BPM | BODY MASS INDEX: 27.02 KG/M2 | DIASTOLIC BLOOD PRESSURE: 68 MMHG | HEIGHT: 71 IN

## 2022-03-23 DIAGNOSIS — R07.89 CHEST WALL PAIN: Primary | ICD-10-CM

## 2022-03-23 DIAGNOSIS — E78.2 MIXED HYPERLIPIDEMIA: ICD-10-CM

## 2022-03-23 DIAGNOSIS — Z95.0 S/P CARDIAC PACEMAKER PROCEDURE: Primary | ICD-10-CM

## 2022-03-23 DIAGNOSIS — I49.5 SICK SINUS SYNDROME: ICD-10-CM

## 2022-03-23 DIAGNOSIS — Z95.0 S/P CARDIAC PACEMAKER PROCEDURE: ICD-10-CM

## 2022-03-23 DIAGNOSIS — I25.10 CORONARY ARTERY DISEASE INVOLVING NATIVE CORONARY ARTERY OF NATIVE HEART WITHOUT ANGINA PECTORIS: ICD-10-CM

## 2022-03-23 DIAGNOSIS — I44.1 MOBITZ TYPE 2 SECOND DEGREE HEART BLOCK: ICD-10-CM

## 2022-03-23 DIAGNOSIS — I10 ESSENTIAL HYPERTENSION: ICD-10-CM

## 2022-03-23 PROBLEM — R07.9 CHEST PAIN: Status: RESOLVED | Noted: 2020-02-06 | Resolved: 2022-03-23

## 2022-03-23 PROCEDURE — 93000 ELECTROCARDIOGRAM COMPLETE: CPT | Performed by: INTERNAL MEDICINE

## 2022-03-23 PROCEDURE — 93296 REM INTERROG EVL PM/IDS: CPT | Performed by: INTERNAL MEDICINE

## 2022-03-23 PROCEDURE — 93294 REM INTERROG EVL PM/LDLS PM: CPT | Performed by: INTERNAL MEDICINE

## 2022-03-23 PROCEDURE — 93280 PM DEVICE PROGR EVAL DUAL: CPT | Performed by: INTERNAL MEDICINE

## 2022-03-23 PROCEDURE — 99214 OFFICE O/P EST MOD 30 MIN: CPT | Performed by: INTERNAL MEDICINE

## 2022-03-23 RX ORDER — FUROSEMIDE 20 MG/1
20 TABLET ORAL 2 TIMES DAILY
COMMUNITY

## 2022-03-23 RX ORDER — FLUTICASONE PROPIONATE 50 MCG
2 SPRAY, SUSPENSION (ML) NASAL DAILY
COMMUNITY

## 2022-03-23 RX ORDER — GABAPENTIN 300 MG/1
300 CAPSULE ORAL 3 TIMES DAILY
COMMUNITY
End: 2023-03-28

## 2022-03-23 NOTE — PROGRESS NOTES
"Subjective    Dyllan Greene is a 86 y.o. male. FU of chest pain, rhythm, ihd and risks    History of Present Illness     SSS - AVB - PERM PACER:  His pacer interrogations show ap/=79/100 and no tachyarrhythmias. Mild pain at the pacer site.    IHD:  Had a recent hospitalization in [place] for cp and was found to have low-risk stress nuclear and ECHO was good as well. He had sudden onset sharp pain at the pacer site that occurred at rest and did not radiate and was not associated with n/v/d/soa. He walks a lot during the day and has good stamina and no exertional cp or unusual soa. Is adherent to meds and EKG is nsc today.    HTN:  Does not check at home but clinic readings are all \"ok\".    HLD:  He is on a potent statin and lipid checks with his pcp are \"good\"        The following portions of the patient's history were reviewed and updated as appropriate: allergies, current medications, past family history, past medical history, past social history, past surgical history and problem list.    Patient Active Problem List   Diagnosis   • S/P cardiac pacemaker procedure   • Mobitz type 2 second degree heart block   • Coronary artery disease involving native coronary artery of native heart without angina pectoris   • Essential hypertension   • Mixed hyperlipidemia   • S/P CABG x 3   • Dizziness   • NSVT (nonsustained ventricular tachycardia) (HCC)   • Near syncope   • PAT (paroxysmal atrial tachycardia) (HCC)   • Sick sinus syndrome (HCC)   • Chest wall pain   • Presence of drug coated stent in LAD coronary artery   • Bilateral lower extremity edema       No Known Allergies    Family History   Problem Relation Age of Onset   • Heart disease Mother    • Coronary artery disease Mother    • Liver disease Father        Social History     Socioeconomic History   • Marital status:    Tobacco Use   • Smoking status: Former Smoker   • Smokeless tobacco: Never Used   • Tobacco comment: quit 1984   Substance and " Sexual Activity   • Alcohol use: No   • Drug use: No   • Sexual activity: Defer         Current Outpatient Medications:   •  albuterol sulfate  (90 Base) MCG/ACT inhaler, Inhale 2 puffs Every 4 (Four) Hours As Needed for Wheezing., Disp: , Rfl:   •  amLODIPine (NORVASC) 5 MG tablet, TAKE 1 TABLET BY MOUTH DAILY, Disp: 30 tablet, Rfl: 0  •  aspirin 81 MG chewable tablet, Chew 81 mg Daily., Disp: , Rfl:   •  busPIRone (BUSPAR) 5 MG tablet, Take 5 mg by mouth 2 (Two) Times a Day., Disp: , Rfl:   •  fluticasone (Flonase Allergy Relief) 50 MCG/ACT nasal spray, 2 sprays into the nostril(s) as directed by provider Daily., Disp: , Rfl:   •  furosemide (LASIX) 20 MG tablet, Take 20 mg by mouth 2 (Two) Times a Day., Disp: , Rfl:   •  gabapentin (NEURONTIN) 300 MG capsule, Take 300 mg by mouth 3 (Three) Times a Day., Disp: , Rfl:   •  losartan (COZAAR) 25 MG tablet, TAKE 1 TABLET BY MOUTH DAILY., Disp: 90 tablet, Rfl: 3  •  metoprolol tartrate (LOPRESSOR) 25 MG tablet, Take 1 tablet by mouth 2 (Two) Times a Day., Disp: 60 tablet, Rfl: 0  •  nitroglycerin (NITROSTAT) 0.4 MG SL tablet, Place 0.4 mg under the tongue Every 5 (Five) Minutes As Needed for Chest Pain. Take no more than 3 doses in 15 minutes., Disp: , Rfl:   •  Omega-3 Fatty Acids (FISH OIL) 1000 MG capsule capsule, Take  by mouth Daily With Breakfast., Disp: , Rfl:   •  simvastatin (ZOCOR) 80 MG tablet, Take 80 mg by mouth Every Night., Disp: , Rfl:     Past Surgical History:   Procedure Laterality Date   • CARDIAC ELECTROPHYSIOLOGY PROCEDURE N/A 7/26/2017    Procedure: Pacemaker SC new;  Surgeon: Andrew Germain MD;  Location: [facility] CATH INVASIVE LOCATION;  Service:    • CHOLECYSTECTOMY     • CORONARY ARTERY BYPASS GRAFT     • HEMORRHOIDECTOMY     • HIP SURGERY     • JOINT REPLACEMENT         Review of Systems   Constitutional: Negative for activity change, appetite change, fatigue and unexpected weight change.   Respiratory: Negative for shortness  "of breath and wheezing.    Cardiovascular: Positive for chest pain. Negative for palpitations and leg swelling.   Gastrointestinal: Negative for abdominal pain and blood in stool.   Genitourinary: Negative for difficulty urinating and hematuria.       /68   Pulse 60   Ht 180.3 cm (71\")   Wt 87.5 kg (193 lb)   BMI 26.92 kg/m²   Procedures    Objective   Physical Exam  Constitutional:       Appearance: Normal appearance.   Cardiovascular:      Rate and Rhythm: Normal rate and regular rhythm.      Pulses: Normal pulses.      Heart sounds: Normal heart sounds. No murmur heard.    No friction rub. No gallop.   Pulmonary:      Effort: Pulmonary effort is normal.      Breath sounds: Normal breath sounds. No wheezing or rales.   Chest:      Chest wall: Tenderness present.   Abdominal:      General: Bowel sounds are normal.   Musculoskeletal:      Right lower leg: No edema.      Left lower leg: No edema.   Skin:     General: Skin is warm and dry.   Neurological:      General: No focal deficit present.      Mental Status: He is oriented to person, place, and time.         Assessment/Plan   Diagnoses and all orders for this visit:    1. Chest wall pain (Primary)  Comments:  neg valladares for acs at [facility] with ECHO and Emma ok - non-cardiac  Orders:  -     ECG 12 Lead    2. Coronary artery disease involving native coronary artery of native heart without angina pectoris  Comments:  no ischemic cp    3. Essential hypertension  Comments:  controlled    4. Mixed hyperlipidemia  Comments:  controlled    5. S/P cardiac pacemaker procedure  Comments:  good funcition - mild pain at the site without signs of inflammation        Mdm=mod - new symptom with [facility] records to review and checking of pacer          Return in about 6 months (around 9/23/2022) for Next scheduled follow up.  Orders Placed This Encounter   Procedures   • ECG 12 Lead     Order Specific Question:   Reason for Exam:     Answer:   [email or web address]     Order Specific " Question:   Release to patient     Answer:   Immediate

## 2022-03-23 NOTE — PROGRESS NOTES
Dual Chamber Pacemaker Evaluation Report  IN OFFICE INTERROGATION    March 23, 2022    Primary Cardiologist: Jessa  : Guidant Model: [employer] MRI EL L131  Implant date: 7/26/2017    Reason for evaluation: routine  Indication for pacemaker: complete heart block and second degree AV block    Measurements  Atrial sensing - P wave: 5.9 mV  Atrial threshold: 1V@ 0.4ms  Atrial lead impedance: 676 ohms  Ventricular sensing - R wave: No R wave at VVI 30  Ventricular threshold: 1 V @ 0.4 ms  Ventricular lead impedance:   698 ohms     Manual sensing and threshold testing performed:  Yes    Diagnostic Data  Atrial paced: 79 %  Ventricular paced: 100 %    Episodes recorded since 3/14/2022:  No episodes.    Battery status: satisfactory   Estimated 9.5 years remaining      Final Parameters  Mode:  DDDR  Lower rate: 60 bpm   Upper rate: 120 bpm  AV Delay: paced- 120-180 ms  Lbzxnu-[id number] ms  Atrial - Amplitude: 2 V   Pulse width: 0.4 ms   Sensitivity: 0.25 mV     Ventricular - Amplitude: 1.5 V  Pulse width: 0.4 ms  Sensitivity: 0.6 mV    Changes made: Normal [place] atrial output changed to Auto  Conclusions: normal pacemaker function, stable pacing and sensing thresholds and adequate battery reserve    Follow up: Every 3 months via latitude, annually in office

## 2022-09-21 PROCEDURE — 93296 REM INTERROG EVL PM/IDS: CPT | Performed by: INTERNAL MEDICINE

## 2022-09-21 PROCEDURE — 93294 REM INTERROG EVL PM/LDLS PM: CPT | Performed by: INTERNAL MEDICINE

## 2022-10-26 RX ORDER — LOSARTAN POTASSIUM 25 MG/1
25 TABLET ORAL
Qty: 90 TABLET | Refills: 0 | Status: SHIPPED | OUTPATIENT
Start: 2022-10-26

## 2023-03-28 ENCOUNTER — OFFICE VISIT (OUTPATIENT)
Dept: CARDIOLOGY | Facility: CLINIC | Age: 88
End: 2023-03-28
Payer: MEDICARE

## 2023-03-28 VITALS
WEIGHT: 190 LBS | BODY MASS INDEX: 26.6 KG/M2 | SYSTOLIC BLOOD PRESSURE: 122 MMHG | HEART RATE: 60 BPM | HEIGHT: 71 IN | DIASTOLIC BLOOD PRESSURE: 60 MMHG

## 2023-03-28 DIAGNOSIS — I10 ESSENTIAL HYPERTENSION: ICD-10-CM

## 2023-03-28 DIAGNOSIS — I44.1 MOBITZ TYPE 2 SECOND DEGREE HEART BLOCK: ICD-10-CM

## 2023-03-28 DIAGNOSIS — E78.2 MIXED HYPERLIPIDEMIA: ICD-10-CM

## 2023-03-28 DIAGNOSIS — Z95.0 S/P CARDIAC PACEMAKER PROCEDURE: ICD-10-CM

## 2023-03-28 DIAGNOSIS — I25.10 CORONARY ARTERY DISEASE INVOLVING NATIVE CORONARY ARTERY OF NATIVE HEART WITHOUT ANGINA PECTORIS: Primary | ICD-10-CM

## 2023-03-28 PROCEDURE — 1159F MED LIST DOCD IN RCRD: CPT | Performed by: INTERNAL MEDICINE

## 2023-03-28 PROCEDURE — 99213 OFFICE O/P EST LOW 20 MIN: CPT | Performed by: INTERNAL MEDICINE

## 2023-03-28 PROCEDURE — 93000 ELECTROCARDIOGRAM COMPLETE: CPT | Performed by: INTERNAL MEDICINE

## 2023-03-28 PROCEDURE — 1160F RVW MEDS BY RX/DR IN RCRD: CPT | Performed by: INTERNAL MEDICINE

## 2023-03-28 NOTE — PROGRESS NOTES
"Subjective    Dyllan Greene is a 87 y.o. male. Fu of rhythm, ihd and risks    History of Present Illness     HX AVB - PERM PACER:  Has good stamina and no decline over the past year. No light-headedness or palpitations. Pacer check today is ap/=95/100, 8.5 yr battery  and no tachy problems seen. No pain at the pacer site. EKG today is a and v paced and is nsc    IHD:  Is very active with outdoor activity such as horse riding and yard work. Has no cp or unusual soa and he is pleased with his stamina.    HTN:  Does not check at home but clinic checks are all \"good\"    HLD:  He is on a potent statin and last years LDL=83 and it is checked by his pcp      The following portions of the patient's history were reviewed and updated as appropriate: allergies, current medications, past family history, past medical history, past social history, past surgical history and problem list.    Patient Active Problem List   Diagnosis   • S/P cardiac pacemaker procedure   • Mobitz type 2 second degree heart block   • Coronary artery disease involving native coronary artery of native heart without angina pectoris   • Essential hypertension   • Mixed hyperlipidemia   • S/P CABG x 3   • Dizziness   • NSVT (nonsustained ventricular tachycardia)   • Near syncope   • PAT (paroxysmal atrial tachycardia) (HCC)   • Sick sinus syndrome (HCC)   • Chest wall pain   • Presence of drug coated stent in LAD coronary artery   • Bilateral lower extremity edema       No Known Allergies    Family History   Problem Relation Age of Onset   • Heart disease Mother    • Coronary artery disease Mother    • Liver disease Father        Social History     Socioeconomic History   • Marital status:    Tobacco Use   • Smoking status: Former   • Smokeless tobacco: Never   • Tobacco comments:     quit 1984   Substance and Sexual Activity   • Alcohol use: No   • Drug use: No   • Sexual activity: Defer         Current Outpatient Medications:   •  albuterol " sulfate  (90 Base) MCG/ACT inhaler, Inhale 2 puffs Every 4 (Four) Hours As Needed for Wheezing., Disp: , Rfl:   •  amLODIPine (NORVASC) 5 MG tablet, TAKE 1 TABLET BY MOUTH DAILY, Disp: 30 tablet, Rfl: 0  •  aspirin 81 MG chewable tablet, Chew 1 tablet Daily., Disp: , Rfl:   •  busPIRone (BUSPAR) 5 MG tablet, Take 1 tablet by mouth 2 (Two) Times a Day., Disp: , Rfl:   •  fluticasone (FLONASE) 50 MCG/ACT nasal spray, 2 sprays into the nostril(s) as directed by provider Daily., Disp: , Rfl:   •  furosemide (LASIX) 20 MG tablet, Take 1 tablet by mouth 2 (Two) Times a Day., Disp: , Rfl:   •  losartan (COZAAR) 25 MG tablet, TAKE 1 TABLET BY MOUTH DAILY., Disp: 90 tablet, Rfl: 0  •  metoprolol tartrate (LOPRESSOR) 25 MG tablet, Take 1 tablet by mouth 2 (Two) Times a Day., Disp: 60 tablet, Rfl: 0  •  nitroglycerin (NITROSTAT) 0.4 MG SL tablet, Place 1 tablet under the tongue Every 5 (Five) Minutes As Needed for Chest Pain. Take no more than 3 doses in 15 minutes., Disp: , Rfl:   •  Omega-3 Fatty Acids (FISH OIL) 1000 MG capsule capsule, Take  by mouth Daily With Breakfast., Disp: , Rfl:   •  simvastatin (ZOCOR) 80 MG tablet, Take 1 tablet by mouth Every Night., Disp: , Rfl:     Past Surgical History:   Procedure Laterality Date   • CARDIAC ELECTROPHYSIOLOGY PROCEDURE N/A 7/26/2017    Procedure: Pacemaker SC new;  Surgeon: Andrew Germain MD;  Location: [facility] CATH INVASIVE LOCATION;  Service:    • CHOLECYSTECTOMY     • CORONARY ARTERY BYPASS GRAFT     • HEMORRHOIDECTOMY     • HIP SURGERY     • JOINT REPLACEMENT         Review of Systems   Constitutional: Negative for activity change, appetite change, fatigue and unexpected weight change.   Respiratory: Negative for shortness of breath and wheezing.    Cardiovascular: Negative for chest pain, palpitations and leg swelling.   Gastrointestinal: Negative for abdominal pain and blood in stool.   Genitourinary: Negative for difficulty urinating and hematuria.  "  Musculoskeletal: Positive for arthralgias. Negative for neck pain and neck stiffness.   Neurological: Negative for syncope and weakness.       /60   Pulse 60   Ht 180.3 cm (71\")   Wt 86.2 kg (190 lb)   BMI 26.50 kg/m²   Procedures    Objective   Physical Exam  Constitutional:       Appearance: Normal appearance.   Cardiovascular:      Rate and Rhythm: Normal rate and regular rhythm.      Heart sounds: No murmur heard.    No friction rub. No gallop.   Pulmonary:      Effort: Pulmonary effort is normal.      Breath sounds: Normal breath sounds. No wheezing or rales.   Abdominal:      General: Bowel sounds are normal.      Tenderness: There is no abdominal tenderness.   Musculoskeletal:      Right lower leg: No edema.      Left lower leg: No edema.   Skin:     General: Skin is warm.   Neurological:      General: No focal deficit present.   Psychiatric:         Mood and Affect: Mood normal.         Assessment & Plan   Diagnoses and all orders for this visit:    1. Coronary artery disease involving native coronary artery of native heart without angina pectoris (Primary)  Comments:  good stamina and no angina  Orders:  -     ECG 12 Lead    2. Mobitz type 2 second degree heart block  Comments:  asymptomatic since pacer placed    3. Essential hypertension  Comments:  controlled    4. Mixed hyperlipidemia  Comments:  fair control    5. S/P cardiac pacemaker procedure  Comments:  good function                 Return in about 1 year (around 3/28/2024).  Orders Placed This Encounter   Procedures   • ECG 12 Lead     Order Specific Question:   Reason for Exam:     Answer:   [email or web address]     Order Specific Question:   Release to patient     Answer:   Routine Release     "

## 2023-04-21 RX ORDER — LOSARTAN POTASSIUM 25 MG/1
25 TABLET ORAL
Qty: 90 TABLET | Refills: 3 | Status: SHIPPED | OUTPATIENT
Start: 2023-04-21

## 2024-01-26 RX ORDER — LOSARTAN POTASSIUM 25 MG/1
25 TABLET ORAL
OUTPATIENT
Start: 2024-01-26

## 2024-11-28 NOTE — TELEPHONE ENCOUNTER
"Patients granddaughter has called in today stating yesterday patient was having CP so she presented him to the ER in [place]. The doctor plans to discharge him tomorrow after his stress test is done. Patient had Echo done today. Granddaughter states there was \"bloodflow going backwards\" noted on echo report and she was wondering if he has always had this?     I advised her that patient has not been seen in 2 years and after he is discharged from [facility] he may need to make FU with us.   " Pt requesting to ask Dr. Traore for additional dose of Norco. Pt informed per Dr. Traore, no additional dose of Norco.

## (undated) DEVICE — PK PM 30

## (undated) DEVICE — ELECTRD PAD DEFIB A/

## (undated) DEVICE — GOLDVAC PUSH BUTTON ELECTROSURGICAL SMOKE EVACUATION HANDPIECE: Brand: GOLDVAC

## (undated) DEVICE — SOL NS 500ML

## (undated) DEVICE — SKIN AFFIX SURG ADHESIVE 72/CS 0.55ML: Brand: MEDLINE

## (undated) DEVICE — INTRO TEAR AWAY/LVD W/SD PRT 6F 13CM

## (undated) DEVICE — ST INF 2NDARY 20DRP VNT/NOVNT 30IN

## (undated) DEVICE — EACH VASCULAR SOLUTIONS D-STAT® FLOWABLE HEMOSTAT (D-STAT) INCLUDES THE FOLLOWING COMPONENTS: THROMBIN VIAL (5,000 UNITS); COLLAGEN (200MG), CONTAINED IN 10ML SYRINGE WITH ATTACHED MIXING LUER; DILUENT VIAL (5ML); MIXING ACCESSORIES (10ML SYRINGE AND NEEDLELESS, NON-CORING VIAL ACCESS DEVICE); APPLICATOR TIPS: (1) SMALL BORE TIP, (1) 20-GAUGE, 2.75" NEEDLE.THE THROMBIN IS A PROTEIN SUBSTANCE PRODUCED THROUGH A CONVERSION REACTION IN WHICH PROTHROMBIN OF BOVINE ORIGIN IS ACTIVATED BY TISSUE THROMBOPLASTIN OF BOVINE-ORIGIN IN THE PRESENCE OF CALCIUM CHLORIDE. IT IS SUPPLIED AS A STERILE POWDER THAT HAS BEEN FREEZE-DRIED IN THE FINAL CONTAINER. ALSO CONTAINED IN THE THROMBIN VIAL ARE MANNITOL AND SODIUM CHLORIDE. MANNITOL IS INCLUDED TO MAKE THE DRIED PRODUCT FRIABLE AND MORE READILY SOLUBLE. THE MATERIAL CONTAINS NO PRESERVATIVE AND HAS BEEN CHROMATOGRAPHICALLY PURIFIED. THROMBIN REQUIRES NO INTERMEDIATE PHYSIOLOGICAL AGENT FOR ITS REACTION. IT CONVERTS FIBRINOGEN DIRECTLY TO FIBRIN.THE COLLAGEN IS A SOFT, WHITE, PLIABLE, ABSORBENT HEMOSTATIC AGENT DERIVED FROM PURIFIED BOVINE DEEP FLEXOR TENDON. IT IS PREPARED IN A LOOSE FIBROUS FORM. COLLAGEN ATTRACTS PLATELETS THAT ADHERE TO THE FIBRILS AND UNDERGO THE RELEASE PHENOMENON TO TRIGGER AGGREGATION OF PLATELETS INTO THROMBI IN THE INTERSTICES OF THE FIBROUS MASS. THE COLLAGEN PROVIDES A THREE-DIMENSIONAL MATRIX FOR ADDITIONAL STRENGTHENING OF THE CLOT. THE EFFECT ON PLATELET ADHESION AND AGGREGATION IS NOT INHIBITED BY HEPARIN IN VITRO.THE DILUENT IS A STERILE SOLUTION OF CALCIUM CHLORIDE AND WATER, BUFFERED WITH TROMETHAMINE (TRIS). USING THE MIXING ACCESSORIES, BOTH THE THROMBIN AND COLLAGEN ARE RECONSTITUTED WITH THE DILUENT PRIOR TO USE. THE HEMOSTAT IS DELIVERED TO THE INTENDED TREATMENT SITE USING THE PROVIDED APPLICATOR TIPS. HEMOSTASIS IS ACHIEVED BY THE PHYSIOLOGICAL COAGULATION-INDUCING PROPERTIES OF THE D-STAT. THE D-STAT IS BIOCOMPATIBLE, NON-PYROGENIC, AND INTENDED TO BE LEFT IN SITU.: Brand: D-STAT® FLOWABLE HEMOSTAT

## (undated) DEVICE — ST PRIM PUMP 20DRP 3VLV 127IN

## (undated) DEVICE — APPL CHLORAPREP W/TINT 26ML ORNG

## (undated) DEVICE — SOL IRR NACL 0.9PCT BT 1000ML

## (undated) DEVICE — DISPOSABLE SURGICAL CABLE